# Patient Record
Sex: FEMALE | Race: WHITE | Employment: STUDENT | URBAN - METROPOLITAN AREA
[De-identification: names, ages, dates, MRNs, and addresses within clinical notes are randomized per-mention and may not be internally consistent; named-entity substitution may affect disease eponyms.]

---
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
## 2019-01-08 ENCOUNTER — RASH (OUTPATIENT)
Dept: URBAN - METROPOLITAN AREA CLINIC 31 | Facility: CLINIC | Age: 19
Setting detail: DERMATOLOGY
End: 2019-01-08

## 2019-01-08 PROCEDURE — 99213 OFFICE O/P EST LOW 20 MIN: CPT

## 2019-01-08 PROCEDURE — 87101 SKIN FUNGI CULTURE: CPT

## 2019-01-17 ENCOUNTER — RX ONLY (RX ONLY)
Age: 19
End: 2019-01-17

## 2019-01-17 RX ORDER — MUPIROCIN 20 MG/G
1 APPLICATION OINTMENT TOPICAL QHS
Qty: 30 | Refills: 0
Start: 2019-01-17

## 2019-01-17 RX ORDER — DOXYCYCLINE HYCLATE 100 MG/1
1 CAPSULE CAPSULE, GELATIN COATED ORAL BID
Qty: 20 | Refills: 0
Start: 2019-01-17

## 2019-02-20 ENCOUNTER — HOSPITAL ENCOUNTER (OUTPATIENT)
Dept: PHYSICAL THERAPY | Age: 19
Discharge: HOME | End: 2019-02-20
Payer: COMMERCIAL

## 2019-02-20 PROCEDURE — 97110 THERAPEUTIC EXERCISES: CPT

## 2019-02-20 PROCEDURE — 97140 MANUAL THERAPY 1/> REGIONS: CPT

## 2019-02-20 PROCEDURE — 97162 PT EVAL MOD COMPLEX 30 MIN: CPT

## 2019-02-20 NOTE — THERAPY EVALUATION
Yimi Perdue  : 2000 [id number] [facility],2[address] Floor [address]  [address] [place] [address].  [phone]   [phone]        OUTPATIENT PHYSICAL THERAPY:Initial Assessment 2019         ICD-10: Treatment Diagnosis: Pain in right knee (M25.561) ,   Precautions/Allergies:   Patient has no allergy information on record. Fall Risk Score:     Ambulatory/Rehab Services H2 Model Falls Risk Assessment    Risk Factors:       No Risk Factors Identified Ability to Rise from Chair:       (0)  Ability to rise in a single movement    Falls Prevention Plan:       No modifications necessary   Total: (5 or greater = High Risk): 0     [facility] of [employer]. All Rights Reserved. [facility] [employer] Patent #[id number]. Federal Law prohibits the replication, distribution or use without written permission from [facility] of [address] [place] [address]     MD Orders: Eval and Treat  MEDICAL/REFERRING DIAGNOSIS:  Right knee pain [M25.561]   DATE OF ONSET: 3 weeks ago   REFERRING PHYSICIAN: Siffri, Brodie Runner, MD  RETURN PHYSICIAN APPOINTMENT: tbd      INITIAL ASSESSMENT:  Ms. Starla Medina presents to therapy with pain in the R knee that started a few months ago and has gotten worse. Pt is having decreased endurance due to the pain and is not able to perform any gym activities or running without the pain. Pt would benefit from skilled PT for above deficits following an MRI next week to return to prior level of function painfree. PROBLEM LIST (Impacting functional limitations):  1. Decreased Strength  2. Decreased Ambulation Ability/Technique  3. Decreased Balance  4. Increased Pain  5. Decreased Activity Tolerance INTERVENTIONS PLANNED:  1. Balance Exercise  2. Cold  3. Electrical Stimulation  4. Heat  5. Home Exercise Program (HEP)  6. Iontophoresis  7. Manual Therapy  8. Therapeutic Activites  9. Therapeutic Exercise/Strengthening  10. Ultrasound (US)  11. Whirlpool  12.  Dry needling    TREATMENT PLAN:  Effective Dates: 2/20/2019 TO 4/21/2019 (60 days). Frequency/Duration: 2 times a week for 60 Days  GOALS: (Goals have been discussed and agreed upon with patient.)  Short-Term Functional Goals: Time Frame: 4 weeks   1. Ms. Edilia Pizano to be independent with HEP. 2. Pt able to have no pain with ambulation or at rest 100% of time. 3. Pt to demo full HS stretch without pain in the back of the knee. Discharge Goals: Time Frame: 8 weeks   1. Pt able to return to running painfree in the R knee 100% of time. 2. Pt able to demo normal gym activities without limtiations 100% of time. Rehabilitation Potential For Stated Goals: Excellent  Regarding [facility] therapy, I certify that the treatment plan above will be carried out by a therapist or under their direction. Thank you for this referral,  Geno Machado, PT, DPT       Referring Physician Signature: Joanne Newton MD              Date                      HISTORY:   History of Present Injury/Illness (Reason for Referral):  Pt 21 y/o F with pain in the R knee that started after running in January. She ran a marathon back in October and had been in therapy previously for the other knee. Pt rested for a solid 2 weeks and the knee starting feeling better. She started having the pain in the front of the knee near the VMO and she now feels it more toward the medial and inside hamstring tendon. Pt stated he does not feel it with with biking but does feel it some with walking as a more strained feeling. Pt is not having any swelling but is scheduled for an MRI on Monday. The doctor wanted to give an injection but wasn't sure if it was quad or hamstring at this time. Past Medical History/Comorbidities:   Ms. Edilia Pizano  has no past medical history on file. Ms. Edilia Pizano  has no past surgical history on file.   Social History/Living Environment:     Lives on campus   Prior Level of Function/Work/Activity:  Student at [employer] Side: RIGHT  Current Medications:  No current outpatient medications on file. Date Last Reviewed:  2/20/2019   # of Personal Factors/Comorbidities that affect the Plan of Care: 3+: HIGH COMPLEXITY   EXAMINATION:   Observation/Orthostatic Postural Assessment:          Good   Palpation:          Tenderness over the gracilis and VMO   ROM:     No complaint of ROM concern      Strength:     L knee      Flexion: 5/5     Extension: 5/5    R knee      Flexion: 5/5     Extension: 5/5    Special Tests:          Lachmans (-), Grind (-), McMurrays (-)  Neurological Screen:        Sensation: no complaint of numbness or tingling   Functional Mobility:         Independent   Balance:          Good    Body Structures Involved:  1. Muscles  2. Ligaments Body Functions Affected:  1. Movement Related Activities and Participation Affected:  1. Mobility   # of elements that affect the Plan of Care: 4+: HIGH COMPLEXITY   CLINICAL PRESENTATION:   Presentation: Evolving clinical presentation with changing clinical characteristics: MODERATE COMPLEXITY   CLINICAL DECISION MAKING:   Tool Used: VAS pain scale : 5/10     Medical Necessity:   · Patient is expected to demonstrate progress in strength and balance to increase independence with functional and recreational activities painfree. .  Reason for Services/Other Comments:  · Patient continues to require present interventions due to patient's inability to perform functional activities painfree. .   Use of outcome tool(s) and clinical judgement create a POC that gives a: Questionable prediction of patient's progress: MODERATE COMPLEXITY   TREATMENT:   (In addition to Assessment/Re-Assessment sessions the following treatments were rendered)      THERAPEUTIC EXERCISE: (see chart below for  minutes):  Exercises per grid below to improve mobility, strength and balance. Required minimal verbal cues to promote proper body alignment, promote proper body posture and promote proper body mechanics. Progressed resistance, range, repetitions and complexity of movement as indicated. MANUAL THERAPY: (see chart below for  minutes): Joint mobilization and Soft tissue mobilization was utilized and necessary because of the patient's restricted joint motion and restricted motion of soft tissue. MODALITIES: (see chart below for  minutes):      see chart below for pain managment. Date: 2-20-19  (EVAL)       Modalities:                                Therapeutic Exercise: 10 mins        HS stretch  With active stretching, AP for nerve glides and using strap for static                                                Proprioceptive Activities:                                Manual Therapy: 15 mins        STM with dry needling  15 mins                Therapeutic Activities:                                        HEP: pt was given stretching and ice massage for home use as well as manual massage for the VMO and hamstring performed independently   [facility] Portal  Treatment/Session Assessment:  Pt is having MRI on Monday and will return to the doctor next Thursday for results. Pt seems to have a strain of the gracilis possibly the medial hamstring with some patellofemoral pain in the R knee. Pt also has some swelling in the popliteal fossa leading to indicate possibly a bakers cyst. MRI will tell results and help guide treatment. Today pt had needling done on the areas for tightness and was told to work on ice and anti-inflammatories for the bakers cyst. .    · Pain/ Symptoms: Initial:   4/10 \"its just sore on the back of the knee now. \"  Post Session:  No increase in pain following session. ·   Compliance with Program/Exercises: Will assess as treatment progresses. · Recommendations/Intent for next treatment session: \"Next visit will focus on advancements to more challenging activities\".   Total Treatment Duration:  PT Patient Time In/Time Out  Time In: 1100  Time Out: 450 Samuel Douglas, PT, DPT

## 2019-02-21 ENCOUNTER — APPOINTMENT (OUTPATIENT)
Dept: PHYSICAL THERAPY | Age: 19
End: 2019-02-21
Payer: COMMERCIAL

## 2019-02-25 ENCOUNTER — APPOINTMENT (OUTPATIENT)
Dept: PHYSICAL THERAPY | Age: 19
End: 2019-02-25
Payer: COMMERCIAL

## 2019-02-27 ENCOUNTER — HOSPITAL ENCOUNTER (OUTPATIENT)
Dept: PHYSICAL THERAPY | Age: 19
Discharge: HOME | End: 2019-02-27
Payer: COMMERCIAL

## 2019-02-27 PROCEDURE — 97140 MANUAL THERAPY 1/> REGIONS: CPT

## 2019-02-27 PROCEDURE — 97110 THERAPEUTIC EXERCISES: CPT

## 2019-02-27 NOTE — PROGRESS NOTES
Beck Aguilar  : 2000 [address] [facility],2[address] Floor [address]  [address] [place] [address]  [phone]   [phone]        OUTPATIENT PHYSICAL THERAPY:Daily Note 2019         ICD-10: Treatment Diagnosis: Pain in right knee (M25.561) ,   Precautions/Allergies:   Patient has no allergy information on record. MD Orders: Eval and Treat  MEDICAL/REFERRING DIAGNOSIS:  Right knee pain [M25.561]   DATE OF ONSET: 3 weeks ago   REFERRING PHYSICIAN: Sharon Bridges MD  RETURN PHYSICIAN APPOINTMENT: tbd      INITIAL ASSESSMENT:  Ms. Cash Biswas presents to therapy with pain in the R knee that started a few months ago and has gotten worse. Pt is having decreased endurance due to the pain and is not able to perform any gym activities or running without the pain. Pt would benefit from skilled PT for above deficits following an MRI next week to return to prior level of function painfree. PROBLEM LIST (Impacting functional limitations):  1. Decreased Strength  2. Decreased Ambulation Ability/Technique  3. Decreased Balance  4. Increased Pain  5. Decreased Activity Tolerance INTERVENTIONS PLANNED:  1. Balance Exercise  2. Cold  3. Electrical Stimulation  4. Heat  5. Home Exercise Program (HEP)  6. Iontophoresis  7. Manual Therapy  8. Therapeutic Activites  9. Therapeutic Exercise/Strengthening  10. Ultrasound (US)  11. Whirlpool  12. Dry needling    TREATMENT PLAN:  Effective Dates: 2019 TO 2019 (60 days). Frequency/Duration: 2 times a week for 60 Days  GOALS: (Goals have been discussed and agreed upon with patient.)  Short-Term Functional Goals: Time Frame: 4 weeks   1. Ms. Cash Biswas to be independent with HEP. 2. Pt able to have no pain with ambulation or at rest 100% of time. 3. Pt to demo full HS stretch without pain in the back of the knee. Discharge Goals: Time Frame: 8 weeks   1. Pt able to return to running painfree in the R knee 100% of time. 2. Pt able to demo normal gym activities without limtiations 100% of time. Rehabilitation Potential For Stated Goals: Excellent                HISTORY:   History of Present Injury/Illness (Reason for Referral):  Pt 21 y/o F with pain in the R knee that started after running in January. She ran a marathon back in October and had been in therapy previously for the other knee. Pt rested for a solid 2 weeks and the knee starting feeling better. She started having the pain in the front of the knee near the VMO and she now feels it more toward the medial and inside hamstring tendon. Pt stated he does not feel it with with biking but does feel it some with walking as a more strained feeling. Pt is not having any swelling but is scheduled for an MRI on Monday. The doctor wanted to give an injection but wasn't sure if it was quad or hamstring at this time. Past Medical History/Comorbidities:   Ms. Beba Parson  has no past medical history on file. Ms. Beba Parson  has no past surgical history on file. Social History/Living Environment:     Lives on campus   Prior Level of Function/Work/Activity:  Student at [address] [place] [address] Side:         RIGHT  Current Medications:  No current outpatient medications on file. Date Last Reviewed:  2/27/2019   EXAMINATION:   Observation/Orthostatic Postural Assessment:          Good   Palpation:          Tenderness over the gracilis and VMO   ROM:     No complaint of ROM concern      Strength:     L knee      Flexion: 5/5     Extension: 5/5    R knee      Flexion: 5/5     Extension: 5/5    Special Tests:          Lachmans (-), Grind (-), McMurrays (-)  Neurological Screen:        Sensation: no complaint of numbness or tingling   Functional Mobility:         Independent   Balance:          Good    Body Structures Involved:  1. Muscles  2. Ligaments Body Functions Affected:  1. Movement Related Activities and Participation Affected:  1.  Mobility   CLINICAL PRESENTATION:   CLINICAL DECISION MAKING:   Tool Used: VAS pain scale : 5/10     Medical Necessity:   · Patient is expected to demonstrate progress in strength and balance to increase independence with functional and recreational activities painfree. .  Reason for Services/Other Comments:  · Patient continues to require present interventions due to patient's inability to perform functional activities painfree. .   TREATMENT:   (In addition to Assessment/Re-Assessment sessions the following treatments were rendered)      THERAPEUTIC EXERCISE: (see chart below for  minutes):  Exercises per grid below to improve mobility, strength and balance. Required minimal verbal cues to promote proper body alignment, promote proper body posture and promote proper body mechanics. Progressed resistance, range, repetitions and complexity of movement as indicated. MANUAL THERAPY: (see chart below for  minutes): Joint mobilization and Soft tissue mobilization was utilized and necessary because of the patient's restricted joint motion and restricted motion of soft tissue. MODALITIES: (see chart below for  minutes):      see chart below for pain managment.         Date: 2-20-19  (EVAL) 2-27-19  (Visit 2)       Modalities:                                Therapeutic Exercise: 10 mins  30 mins       HS stretch  With active stretching, AP for nerve glides and using strap for static  Repeat       Resisted side stepping   Green band with push out kettlebell 2L knees bent       RDL walking   Red kettlebell  Forward and back with high knee and lunge 2L       Bike   5 mins       Walking warm up   1L each               Proprioceptive Activities:                                Manual Therapy: 15 mins  15 mins       STM with dry needling  15 mins  15 mins               Therapeutic Activities:                                        HEP: pt was given stretching and ice massage for home use as well as manual massage for the VMO and hamstring performed independently   [facility] Portal  Treatment/Session Assessment:  Pt is feeling much better and is not having any issues with the knee today. Pt was told she could return to jog walk protocol for the day and will see the results of the MRI on Thursday. Continue with POC once pt returns from spring break in a week. · Pain/ Symptoms: Initial:   1/10 \"I can just feel it but I had my MRI yesterday and im going back to see the doctor tomorrow. \"  Post Session:  No increase in pain following session. ·   Compliance with Program/Exercises: Will assess as treatment progresses. · Recommendations/Intent for next treatment session: \"Next visit will focus on advancements to more challenging activities\".   Total Treatment Duration:  PT Patient Time In/Time Out  Time In: 0800  Time Out: [address], PT, DPT

## 2019-03-06 ENCOUNTER — APPOINTMENT (OUTPATIENT)
Dept: PHYSICAL THERAPY | Age: 19
End: 2019-03-06
Payer: COMMERCIAL

## 2019-03-08 ENCOUNTER — APPOINTMENT (OUTPATIENT)
Dept: PHYSICAL THERAPY | Age: 19
End: 2019-03-08
Payer: COMMERCIAL

## 2019-03-13 ENCOUNTER — HOSPITAL ENCOUNTER (OUTPATIENT)
Dept: PHYSICAL THERAPY | Age: 19
Discharge: HOME | End: 2019-03-13
Payer: COMMERCIAL

## 2019-03-15 ENCOUNTER — HOSPITAL ENCOUNTER (OUTPATIENT)
Dept: PHYSICAL THERAPY | Age: 19
Discharge: HOME | End: 2019-03-15
Payer: COMMERCIAL

## 2019-03-15 PROCEDURE — 97140 MANUAL THERAPY 1/> REGIONS: CPT

## 2019-03-15 PROCEDURE — 97110 THERAPEUTIC EXERCISES: CPT

## 2019-03-15 NOTE — PROGRESS NOTES
Kushal Pierson  : 2000 [address] [facility],[address] Floor [address]  [address] [place] [address]  [phone]   [phone]        OUTPATIENT PHYSICAL THERAPY:Daily Note 3/15/2019         ICD-10: Treatment Diagnosis: Pain in right knee (M25.561) ,   Precautions/Allergies:   Patient has no allergy information on record. MD Orders: Eval and Treat  MEDICAL/REFERRING DIAGNOSIS:  Right knee pain [M25.561]   DATE OF ONSET: 3 weeks ago   REFERRING PHYSICIAN: Arden Bridges MD  RETURN PHYSICIAN APPOINTMENT: tbd      INITIAL ASSESSMENT:  Ms. Jason Neal presents to therapy with pain in the R knee that started a few months ago and has gotten worse. Pt is having decreased endurance due to the pain and is not able to perform any gym activities or running without the pain. Pt would benefit from skilled PT for above deficits following an MRI next week to return to prior level of function painfree. PROBLEM LIST (Impacting functional limitations):  1. Decreased Strength  2. Decreased Ambulation Ability/Technique  3. Decreased Balance  4. Increased Pain  5. Decreased Activity Tolerance INTERVENTIONS PLANNED:  1. Balance Exercise  2. Cold  3. Electrical Stimulation  4. Heat  5. Home Exercise Program (HEP)  6. Iontophoresis  7. Manual Therapy  8. Therapeutic Activites  9. Therapeutic Exercise/Strengthening  10. Ultrasound (US)  11. Whirlpool  12. Dry needling    TREATMENT PLAN:  Effective Dates: 2019 TO 2019 (60 days). Frequency/Duration: 2 times a week for 60 Days  GOALS: (Goals have been discussed and agreed upon with patient.)  Short-Term Functional Goals: Time Frame: 4 weeks   1. Ms. Jason Neal to be independent with HEP. 2. Pt able to have no pain with ambulation or at rest 100% of time. 3. Pt to demo full HS stretch without pain in the back of the knee. Discharge Goals: Time Frame: 8 weeks   1. Pt able to return to running painfree in the R knee 100% of time. 2. Pt able to demo normal gym activities without limtiations 100% of time. Rehabilitation Potential For Stated Goals: Excellent                HISTORY:   History of Present Injury/Illness (Reason for Referral):  Pt 21 y/o F with pain in the R knee that started after running in January. She ran a marathon back in October and had been in therapy previously for the other knee. Pt rested for a solid 2 weeks and the knee starting feeling better. She started having the pain in the front of the knee near the VMO and she now feels it more toward the medial and inside hamstring tendon. Pt stated he does not feel it with with biking but does feel it some with walking as a more strained feeling. Pt is not having any swelling but is scheduled for an MRI on Monday. The doctor wanted to give an injection but wasn't sure if it was quad or hamstring at this time. Past Medical History/Comorbidities:   Ms. Franki Reid  has no past medical history on file. Ms. Franki Reid  has no past surgical history on file. Social History/Living Environment:     Lives on campus   Prior Level of Function/Work/Activity:  Student at [address] [place] [address] Side:         RIGHT  Current Medications:  No current outpatient medications on file. Date Last Reviewed:  3/15/2019   EXAMINATION:   Observation/Orthostatic Postural Assessment:          Good   Palpation:          Tenderness over the gracilis and VMO   ROM:     No complaint of ROM concern      Strength:     L knee      Flexion: 5/5     Extension: 5/5    R knee      Flexion: 5/5     Extension: 5/5    Special Tests:          Lachmans (-), Grind (-), McMurrays (-)  Neurological Screen:        Sensation: no complaint of numbness or tingling   Functional Mobility:         Independent   Balance:          Good    Body Structures Involved:  1. Muscles  2. Ligaments Body Functions Affected:  1. Movement Related Activities and Participation Affected:  1.  Mobility   CLINICAL PRESENTATION:   CLINICAL DECISION MAKING:   Tool Used: VAS pain scale : 5/10     Medical Necessity:   · Patient is expected to demonstrate progress in strength and balance to increase independence with functional and recreational activities painfree. .  Reason for Services/Other Comments:  · Patient continues to require present interventions due to patient's inability to perform functional activities painfree. .   TREATMENT:   (In addition to Assessment/Re-Assessment sessions the following treatments were rendered)      THERAPEUTIC EXERCISE: (see chart below for  minutes):  Exercises per grid below to improve mobility, strength and balance. Required minimal verbal cues to promote proper body alignment, promote proper body posture and promote proper body mechanics. Progressed resistance, range, repetitions and complexity of movement as indicated. MANUAL THERAPY: (see chart below for  minutes): Joint mobilization and Soft tissue mobilization was utilized and necessary because of the patient's restricted joint motion and restricted motion of soft tissue. MODALITIES: (see chart below for  minutes):      see chart below for pain managment.         Date: 2-20-19  (EVAL) 2-27-19  (Visit 2)  3-15-19  (Visit 3)      Modalities:                                Therapeutic Exercise: 10 mins  30 mins  30 mins      HS stretch  With active stretching, AP for nerve glides and using strap for static  Repeat  Repeat      Resisted side stepping   Green band with push out kettlebell 2L knees bent  Repeat      RDL walking   Red kettlebell  Forward and back with high knee and lunge 2L  Repeat      Bike   5 mins  Repeat      Walking warm up   1L each  Repeat      Leg press    # 3x8     Proprioceptive Activities:        Toe tapping on foam    3 way x10 each                      Manual Therapy: 15 mins  15 mins  15 mins      STM with dry needling  15 mins  15 mins  15 mins              Therapeutic Activities:                                        HEP: pt was given stretching and ice massage for home use as well as manual massage for the VMO and hamstring performed independently   [facility] Portal  Treatment/Session Assessment:  MRI found small effusion and small Bakers cyst is noted. 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 Pt was told to return to running and try to push the knee a little and see how she feels. Continue with POC 1 time a week. · Pain/ Symptoms: Initial:  0/10 \"Its not hurting me today but im afraid to push it too hard. \"  Post Session:  No increase in pain following session. ·   Compliance with Program/Exercises: Will assess as treatment progresses. · Recommendations/Intent for next treatment session: \"Next visit will focus on advancements to more challenging activities\".   Total Treatment Duration:  PT Patient Time In/Time Out  Time In: 6118  Time Out: 50 Abebe Estrada, PT, DPT

## 2019-03-22 ENCOUNTER — HOSPITAL ENCOUNTER (OUTPATIENT)
Dept: PHYSICAL THERAPY | Age: 19
Discharge: HOME | End: 2019-03-22
Payer: COMMERCIAL

## 2019-03-22 PROCEDURE — 97110 THERAPEUTIC EXERCISES: CPT

## 2019-03-22 PROCEDURE — 97140 MANUAL THERAPY 1/> REGIONS: CPT

## 2019-03-22 NOTE — PROGRESS NOTES
Ajith Bennett  : 2000 [address] [facility],[address]  [address] [place] [address].  [phone]   [phone]        OUTPATIENT PHYSICAL THERAPY:Daily Note 3/22/2019         ICD-10: Treatment Diagnosis: Pain in right knee (M25.561) ,   Precautions/Allergies:   Patient has no allergy information on record. MD Orders: Eval and Treat  MEDICAL/REFERRING DIAGNOSIS:  Right knee pain [M25.561]   DATE OF ONSET: 3 weeks ago   REFERRING PHYSICIAN: Niyah Bridges MD  RETURN PHYSICIAN APPOINTMENT: tbd      INITIAL ASSESSMENT:  Ms. Coleen Madrigal presents to therapy with pain in the R knee that started a few months ago and has gotten worse. Pt is having decreased endurance due to the pain and is not able to perform any gym activities or running without the pain. Pt would benefit from skilled PT for above deficits following an MRI next week to return to prior level of function painfree. PROBLEM LIST (Impacting functional limitations):  1. Decreased Strength  2. Decreased Ambulation Ability/Technique  3. Decreased Balance  4. Increased Pain  5. Decreased Activity Tolerance INTERVENTIONS PLANNED:  1. Balance Exercise  2. Cold  3. Electrical Stimulation  4. Heat  5. Home Exercise Program (HEP)  6. Iontophoresis  7. Manual Therapy  8. Therapeutic Activites  9. Therapeutic Exercise/Strengthening  10. Ultrasound (US)  11. Whirlpool  12. Dry needling    TREATMENT PLAN:  Effective Dates: 2019 TO 2019 (60 days). Frequency/Duration: 2 times a week for 60 Days  GOALS: (Goals have been discussed and agreed upon with patient.)  Short-Term Functional Goals: Time Frame: 4 weeks   1. Ms. Coleen Madrigal to be independent with HEP. 2. Pt able to have no pain with ambulation or at rest 100% of time. 3. Pt to demo full HS stretch without pain in the back of the knee. Discharge Goals: Time Frame: 8 weeks   1. Pt able to return to running painfree in the R knee 100% of time. 2. Pt able to demo normal gym activities without limtiations 100% of time. Rehabilitation Potential For Stated Goals: Excellent                HISTORY:   History of Present Injury/Illness (Reason for Referral):  Pt 21 y/o F with pain in the R knee that started after running in January. She ran a marathon back in October and had been in therapy previously for the other knee. Pt rested for a solid 2 weeks and the knee starting feeling better. She started having the pain in the front of the knee near the VMO and she now feels it more toward the medial and inside hamstring tendon. Pt stated he does not feel it with with biking but does feel it some with walking as a more strained feeling. Pt is not having any swelling but is scheduled for an MRI on Monday. The doctor wanted to give an injection but wasn't sure if it was quad or hamstring at this time. Past Medical History/Comorbidities:   Ms. Darcie Mobley  has no past medical history on file. Ms. Darcie Mobley  has no past surgical history on file. 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 Date Last Reviewed:  3/22/2019   EXAMINATION:   Observation/Orthostatic Postural Assessment:          Good   Palpation:          Tenderness over the gracilis and VMO   ROM:     No complaint of ROM concern      Strength:     L knee      Flexion: 5/5     Extension: 5/5    R knee      Flexion: 5/5     Extension: 5/5    Special Tests:          Lachmans (-), Grind (-), McMurrays (-)  Neurological Screen:        Sensation: no complaint of numbness or tingling   Functional Mobility:         Independent   Balance:          Good    Body Structures Involved:  1. Muscles  2. Ligaments Body Functions Affected:  1. Movement Related Activities and Participation Affected:  1.  Mobility   CLINICAL PRESENTATION:   CLINICAL DECISION MAKING:   Tool Used: VAS pain scale : 5/10     Medical Necessity:   · Patient is expected to demonstrate progress in strength and balance to increase independence with functional and recreational activities painfree. .  Reason for Services/Other Comments:  · Patient continues to require present interventions due to patient's inability to perform functional activities painfree. .   TREATMENT:   (In addition to Assessment/Re-Assessment sessions the following treatments were rendered)      THERAPEUTIC EXERCISE: (see chart below for  minutes):  Exercises per grid below to improve mobility, strength and balance. Required minimal verbal cues to promote proper body alignment, promote proper body posture and promote proper body mechanics. Progressed resistance, range, repetitions and complexity of movement as indicated. MANUAL THERAPY: (see chart below for  minutes): Joint mobilization and Soft tissue mobilization was utilized and necessary because of the patient's restricted joint motion and restricted motion of soft tissue. MODALITIES: (see chart below for  minutes):      see chart below for pain managment.         Date: 2-20-19  (EVAL) 2-27-19  (Visit 2)  3-15-19  (Visit 3)  3-22-19  (Visit 4)     Modalities:                                Therapeutic Exercise: 10 mins  30 mins  30 mins  30 mins     HS stretch  With active stretching, AP for nerve glides and using strap for static  Repeat  Repeat  Repeat     Resisted side stepping   Green band with push out kettlebell 2L knees bent  Repeat  Repeat     RDL walking   Red kettlebell  Forward and back with high knee and lunge 2L  Repeat  Repeat     Bike   5 mins  Repeat  5 mins     Walking warm up   1L each  Repeat  2L     Leg press    # 3x8 120# 3x10     Proprioceptive Activities:        Toe tapping on foam    3 way x10 each                      Manual Therapy: 15 mins  15 mins  15 mins  15 mins     STM with dry needling  15 mins  15 mins  15 mins  15 mins Therapeutic Activities:        Eccentric heel drop     6 in 3x10     Side lunges and reverse     2x10     SL squat with slider     2x10             HEP: pt was given stretching and ice massage for home use as well as manual massage for the VMO and hamstring performed independently   [facility] Portal  Treatment/Session Assessment:  Pt is still showing increased weakness in the VMO and was given more exercises to focus on the VMO in the gym. Continue with strengthening 1-2 times a week. · Pain/ Symptoms: Initial:  0/10 \"Its not hurting me today but im afraid to push it too hard. \"  Post Session:  No increase in pain following session. ·   Compliance with Program/Exercises: Will assess as treatment progresses. · Recommendations/Intent for next treatment session: \"Next visit will focus on advancements to more challenging activities\".   Total Treatment Duration:  PT Patient Time In/Time Out  Time In: 2142  Time Out: 50 Abebe Estrada, PT, DPT

## 2019-03-27 ENCOUNTER — HOSPITAL ENCOUNTER (OUTPATIENT)
Dept: PHYSICAL THERAPY | Age: 19
Discharge: HOME | End: 2019-03-27
Payer: COMMERCIAL

## 2019-03-27 PROCEDURE — 97110 THERAPEUTIC EXERCISES: CPT

## 2019-03-27 PROCEDURE — 97140 MANUAL THERAPY 1/> REGIONS: CPT

## 2019-03-27 NOTE — PROGRESS NOTES
Lindsey Krause  : 2000 [address] [address], [address] [place] [address]  [phone]   [phone]        OUTPATIENT PHYSICAL THERAPY:Daily Note 3/27/2019         ICD-10: Treatment Diagnosis: Pain in right knee (M25.561) ,   Precautions/Allergies:   Patient has no allergy information on record. MD Orders: Eval and Treat  MEDICAL/REFERRING DIAGNOSIS:  Right knee pain [M25.561]   DATE OF ONSET: 3 weeks ago   REFERRING PHYSICIAN: Sai Bridges MD  RETURN PHYSICIAN APPOINTMENT: tbd      INITIAL ASSESSMENT:  Ms. Beck Lr presents to therapy with pain in the R knee that started a few months ago and has gotten worse. Pt is having decreased endurance due to the pain and is not able to perform any gym activities or running without the pain. Pt would benefit from skilled PT for above deficits following an MRI next week to return to prior level of function painfree. PROBLEM LIST (Impacting functional limitations):  1. Decreased Strength  2. Decreased Ambulation Ability/Technique  3. Decreased Balance  4. Increased Pain  5. Decreased Activity Tolerance INTERVENTIONS PLANNED:  1. Balance Exercise  2. Cold  3. Electrical Stimulation  4. Heat  5. Home Exercise Program (HEP)  6. Iontophoresis  7. Manual Therapy  8. Therapeutic Activites  9. Therapeutic Exercise/Strengthening  10. Ultrasound (US)  11. Whirlpool  12. Dry needling    TREATMENT PLAN:  Effective Dates: 2019 TO 2019 (60 days). Frequency/Duration: 2 times a week for 60 Days  GOALS: (Goals have been discussed and agreed upon with patient.)  Short-Term Functional Goals: Time Frame: 4 weeks   1. Ms. Beck Lr to be independent with HEP. 2. Pt able to have no pain with ambulation or at rest 100% of time. 3. Pt to demo full HS stretch without pain in the back of the knee. Discharge Goals: Time Frame: 8 weeks   1. Pt able to return to running painfree in the R knee 100% of time. 2. Pt able to demo normal gym activities without limtiations 100% of time. Rehabilitation Potential For Stated Goals: Excellent                HISTORY:   History of Present Injury/Illness (Reason for Referral):  Pt 19 y/o F with pain in the R knee that started after running in January. She ran a marathon back in October and had been in therapy previously for the other knee. Pt rested for a solid 2 weeks and the knee starting feeling better. She started having the pain in the front of the knee near the VMO and she now feels it more toward the medial and inside hamstring tendon. Pt stated he does not feel it with with biking but does feel it some with walking as a more strained feeling. Pt is not having any swelling but is scheduled for an MRI on Monday. The doctor wanted to give an injection but wasn't sure if it was quad or hamstring at this time. Past Medical History/Comorbidities:   Ms. Otis Maya  has no past medical history on file. Ms. Otis Maya  has no past surgical history on file. Social History/Living Environment:     Lives on campus   Prior Level of Function/Work/Activity:  Student at [address] [place] [address] Side:         RIGHT  Current Medications:  No current outpatient medications on file. Date Last Reviewed:  3/27/2019   EXAMINATION:   Observation/Orthostatic Postural Assessment:          Good   Palpation:          Tenderness over the gracilis and VMO   ROM:     No complaint of ROM concern      Strength:     L knee      Flexion: 5/5     Extension: 5/5    R knee      Flexion: 5/5     Extension: 5/5    Special Tests:          Lachmans (-), Grind (-), McMurrays (-)  Neurological Screen:        Sensation: no complaint of numbness or tingling   Functional Mobility:         Independent   Balance:          Good    Body Structures Involved:  1. Muscles  2. Ligaments Body Functions Affected:  1. Movement Related Activities and Participation Affected:  1.  Mobility   CLINICAL PRESENTATION:   CLINICAL DECISION MAKING:   Tool Used: VAS pain scale : 5/10     Medical Necessity:   · Patient is expected to demonstrate progress in strength and balance to increase independence with functional and recreational activities painfree. .  Reason for Services/Other Comments:  · Patient continues to require present interventions due to patient's inability to perform functional activities painfree. .   TREATMENT:   (In addition to Assessment/Re-Assessment sessions the following treatments were rendered)      THERAPEUTIC EXERCISE: (see chart below for  minutes):  Exercises per grid below to improve mobility, strength and balance. Required minimal verbal cues to promote proper body alignment, promote proper body posture and promote proper body mechanics. Progressed resistance, range, repetitions and complexity of movement as indicated. MANUAL THERAPY: (see chart below for  minutes): Joint mobilization and Soft tissue mobilization was utilized and necessary because of the patient's restricted joint motion and restricted motion of soft tissue. MODALITIES: (see chart below for  minutes):      see chart below for pain managment.         Date: 2-20-19  (EVAL) 2-27-19  (Visit 2)  3-15-19  (Visit 3)  3-22-19  (Visit 4)  3-27-19  (Visit 5)    Modalities:                                Therapeutic Exercise: 10 mins  30 mins  30 mins  30 mins  30 mins    HS stretch  With active stretching, AP for nerve glides and using strap for static  Repeat  Repeat  Repeat  Repeat    Resisted side stepping   Green band with push out kettlebell 2L knees bent  Repeat  Repeat  Repeat blue band    RDL walking   Red kettlebell  Forward and back with high knee and lunge 2L  Repeat  Repeat  Repeat    Bike   5 mins  Repeat  5 mins  Repeat    Walking warm up   1L each  Repeat  2L  Repeat    Leg press    # 3x8 120# 3x10  120# x15  140# x 10  160# x 6   Proprioceptive Activities:        Toe tapping on foam    3 way x10 each                      Manual Therapy: 15 mins  15 mins  15 mins  15 mins  15 mins    STM with dry needling  15 mins  15 mins  15 mins  15 mins  15 mins VMO and medial quad            Therapeutic Activities:        Eccentric heel drop     6 in 3x10  Repeat    Side lunges and reverse     2x10  Repeat with 8#   Added in walking lunge with twist 2L    SL squat with slider     2x10  repeat   Knee extension machine      Eccentric drop 35# 2x10    bosu step up with high knee      2x10    SL modified chair squat      Red kettlebell 3x10    HEP: pt was given stretching and ice massage for home use as well as manual massage for the VMO and hamstring performed independently   [facility] Portal  Treatment/Session Assessment:  Continue to work on [employer] and adding exercises for strengthening as well as stabiltiy. Continue with POC. · Pain/ Symptoms: Initial:  0/10 \"Im doing good. \"  Post Session:  No increase in pain following session. ·   Compliance with Program/Exercises: Will assess as treatment progresses. · Recommendations/Intent for next treatment session: \"Next visit will focus on advancements to more challenging activities\".   Total Treatment Duration:  PT Patient Time In/Time Out  Time In: 4177  Time Out: 50 Abebe Estrada, PT, DPT

## 2019-03-29 ENCOUNTER — HOSPITAL ENCOUNTER (OUTPATIENT)
Dept: PHYSICAL THERAPY | Age: 19
Discharge: HOME | End: 2019-03-29
Payer: COMMERCIAL

## 2019-03-29 PROCEDURE — 97140 MANUAL THERAPY 1/> REGIONS: CPT

## 2019-03-29 PROCEDURE — 97110 THERAPEUTIC EXERCISES: CPT

## 2019-03-29 NOTE — PROGRESS NOTES
Rhonda De Dios  : 2000 [address]  [address] [place] [address].  [phone]   [phone]        OUTPATIENT PHYSICAL THERAPY:Daily Note 3/29/2019         ICD-10: Treatment Diagnosis: Pain in right knee (M25.561) ,   Precautions/Allergies:   Patient has no allergy information on record. MD Orders: Eval and Treat  MEDICAL/REFERRING DIAGNOSIS:  Right knee pain [M25.561]   DATE OF ONSET: 3 weeks ago   REFERRING PHYSICIAN: iRck Bridges MD  RETURN PHYSICIAN APPOINTMENT: tbd      INITIAL ASSESSMENT:  Ms. Reji Ibanez presents to therapy with pain in the R knee that started a few months ago and has gotten worse. Pt is having decreased endurance due to the pain and is not able to perform any gym activities or running without the pain. Pt would benefit from skilled PT for above deficits following an MRI next week to return to prior level of function painfree. PROBLEM LIST (Impacting functional limitations):  1. Decreased Strength  2. Decreased Ambulation Ability/Technique  3. Decreased Balance  4. Increased Pain  5. Decreased Activity Tolerance INTERVENTIONS PLANNED:  1. Balance Exercise  2. Cold  3. Electrical Stimulation  4. Heat  5. Home Exercise Program (HEP)  6. Iontophoresis  7. Manual Therapy  8. Therapeutic Activites  9. Therapeutic Exercise/Strengthening  10. Ultrasound (US)  11. Whirlpool  12. Dry needling    TREATMENT PLAN:  Effective Dates: 2019 TO 2019 (60 days). Frequency/Duration: 2 times a week for 60 Days  GOALS: (Goals have been discussed and agreed upon with patient.)  Short-Term Functional Goals: Time Frame: 4 weeks   1. Ms. Reji Ibanez to be independent with HEP. 2. Pt able to have no pain with ambulation or at rest 100% of time. 3. Pt to demo full HS stretch without pain in the back of the knee. Discharge Goals: Time Frame: 8 weeks   1. Pt able to return to running painfree in the R knee 100% of time. 2. Pt able to demo normal gym activities without limtiations 100% of time. Rehabilitation Potential For Stated Goals: Excellent                HISTORY:   History of Present Injury/Illness (Reason for Referral):  Pt 19 y/o F with pain in the R knee that started after running in January. She ran a marathon back in October and had been in therapy previously for the other knee. Pt rested for a solid 2 weeks and the knee starting feeling better. She started having the pain in the front of the knee near the VMO and she now feels it more toward the medial and inside hamstring tendon. Pt stated he does not feel it with with biking but does feel it some with walking as a more strained feeling. Pt is not having any swelling but is scheduled for an MRI on Monday. The doctor wanted to give an injection but wasn't sure if it was quad or hamstring at this time. Past Medical History/Comorbidities:   Ms. Cari Devi  has no past medical history on file. Ms. Cari Devi  has no past surgical history on file. Social History/Living Environment:     Lives on campus   Prior Level of Function/Work/Activity:  Student at [address] [place] [address] Side:         RIGHT  Current Medications:  No current outpatient medications on file. Date Last Reviewed:  3/29/2019   EXAMINATION:   Observation/Orthostatic Postural Assessment:          Good   Palpation:          Tenderness over the gracilis and VMO   ROM:     No complaint of ROM concern      Strength:     L knee      Flexion: 5/5     Extension: 5/5    R knee      Flexion: 5/5     Extension: 5/5    Special Tests:          Lachmans (-), Grind (-), McMurrays (-)  Neurological Screen:        Sensation: no complaint of numbness or tingling   Functional Mobility:         Independent   Balance:          Good    Body Structures Involved:  1. Muscles  2. Ligaments Body Functions Affected:  1. Movement Related Activities and Participation Affected:  1.  Mobility   CLINICAL PRESENTATION:   CLINICAL DECISION MAKING:   Tool Used: VAS pain scale : 5/10     Medical Necessity:   · Patient is expected to demonstrate progress in strength and balance to increase independence with functional and recreational activities painfree. .  Reason for Services/Other Comments:  · Patient continues to require present interventions due to patient's inability to perform functional activities painfree. .   TREATMENT:   (In addition to Assessment/Re-Assessment sessions the following treatments were rendered)      THERAPEUTIC EXERCISE: (see chart below for  minutes):  Exercises per grid below to improve mobility, strength and balance. Required minimal verbal cues to promote proper body alignment, promote proper body posture and promote proper body mechanics. Progressed resistance, range, repetitions and complexity of movement as indicated. MANUAL THERAPY: (see chart below for  minutes): Joint mobilization and Soft tissue mobilization was utilized and necessary because of the patient's restricted joint motion and restricted motion of soft tissue. MODALITIES: (see chart below for  minutes):      see chart below for pain managment. Date: 2-20-19  (EVAL) 2-27-19  (Visit 2)  3-15-19  (Visit 3)  3-22-19  (Visit 4)  3-27-19  (Visit 5)  3-29-19  (Visit 6)    Modalities:          Ice massage       5 mins                      Therapeutic Exercise: 10 mins  30 mins  30 mins  30 mins  30 mins  25 mins    HS stretch  With active stretching, AP for nerve glides and using strap for static  Repeat  Repeat  Repeat  Repeat  Repeat    Resisted side stepping   Green band with push out kettlebell 2L knees bent  Repeat  Repeat  Repeat blue band  Repeat    RDL walking   Red kettlebell  Forward and back with high knee and lunge 2L  Repeat  Repeat  Repeat  Repeat with lunge and twist 2L 8#    Bike   5 mins  Repeat  5 mins  Repeat  5 mins    Walking warm up   1L each  Repeat  2L  Repeat  Repeat    Leg press    # 3x8 120# 3x10  120# x15  140# x 10  160# x 6 Repeat same weight    Proprioceptive Activities:         Toe tapping on foam    3 way x10 each                         Manual Therapy: 15 mins  15 mins  15 mins  15 mins  15 mins  15mins    STM with dry needling  15 mins  15 mins  15 mins  15 mins  15 mins VMO and medial quad  Repeat             Therapeutic Activities:         Eccentric heel drop     6 in 3x10  Repeat     Side lunges and reverse     2x10  Repeat with 8#   Added in walking lunge with twist 2L  Repeat with twist bilaterally 2L   SL squat with slider     2x10  repeat    Knee extension machine      Eccentric drop 35# 2x10  Repeat    bosu step up with high knee      2x10  Repeat with 3SH   Jump down/landing with hop      20 in step x5             SL modified chair squat      Red kettlebell 3x10     HEP: pt was given stretching and ice massage for home use as well as manual massage for the VMO and hamstring performed independently   [employer] Portal  Treatment/Session Assessment:  Continue to work on [employer] and adding exercises for strengthening as well as stabiltiy. Continue with POC. · Pain/ Symptoms: Initial:  0/10 \"Im doing good. \"  Post Session:  No increase in pain following session. ·   Compliance with Program/Exercises: Will assess as treatment progresses. · Recommendations/Intent for next treatment session: \"Next visit will focus on advancements to more challenging activities\".   Total Treatment Duration:  PT Patient Time In/Time Out  Time In: 6831  Time Out: 50 Abebe Estrada, PT, DPT

## 2019-04-03 ENCOUNTER — HOSPITAL ENCOUNTER (OUTPATIENT)
Dept: PHYSICAL THERAPY | Age: 19
Discharge: HOME | End: 2019-04-03
Payer: COMMERCIAL

## 2019-04-03 PROCEDURE — 97110 THERAPEUTIC EXERCISES: CPT

## 2019-04-03 PROCEDURE — 97140 MANUAL THERAPY 1/> REGIONS: CPT

## 2019-04-03 NOTE — PROGRESS NOTES
Zachphyllis Angelesgailrobe  : 2000 [address], [facility] LARRY. [address].  [phone]   [phone]        OUTPATIENT PHYSICAL THERAPY:Daily Note 4/3/2019         ICD-10: Treatment Diagnosis: Pain in right knee (M25.561) ,   Precautions/Allergies:   Patient has no allergy information on record. MD Orders: Eval and Treat  MEDICAL/REFERRING DIAGNOSIS:  Right knee pain [M25.561]   DATE OF ONSET: 3 weeks ago   REFERRING PHYSICIAN: Mary Lou Bridges MD  RETURN PHYSICIAN APPOINTMENT: tbd      INITIAL ASSESSMENT:  Ms. Karol Machuca presents to therapy with pain in the R knee that started a few months ago and has gotten worse. Pt is having decreased endurance due to the pain and is not able to perform any gym activities or running without the pain. Pt would benefit from skilled PT for above deficits following an MRI next week to return to prior level of function painfree. PROBLEM LIST (Impacting functional limitations):  1. Decreased Strength  2. Decreased Ambulation Ability/Technique  3. Decreased Balance  4. Increased Pain  5. Decreased Activity Tolerance INTERVENTIONS PLANNED:  1. Balance Exercise  2. Cold  3. Electrical Stimulation  4. Heat  5. Home Exercise Program (HEP)  6. Iontophoresis  7. Manual Therapy  8. Therapeutic Activites  9. Therapeutic Exercise/Strengthening  10. Ultrasound (US)  11. Whirlpool  12. Dry needling    TREATMENT PLAN:  Effective Dates: 2019 TO 2019 (60 days). Frequency/Duration: 2 times a week for 60 Days  GOALS: (Goals have been discussed and agreed upon with patient.)  Short-Term Functional Goals: Time Frame: 4 weeks   1. Ms. Karol Machuca to be independent with HEP. 2. Pt able to have no pain with ambulation or at rest 100% of time. 3. Pt to demo full HS stretch without pain in the back of the knee. Discharge Goals: Time Frame: 8 weeks   1. Pt able to return to running painfree in the R knee 100% of time. 2. Pt able to demo normal gym activities without limtiations 100% of time. Rehabilitation Potential For Stated Goals: Excellent                HISTORY:   History of Present Injury/Illness (Reason for Referral):  Pt 19 y/o F with pain in the R knee that started after running in January. She ran a marathon back in October and had been in therapy previously for the other knee. Pt rested for a solid 2 weeks and the knee starting feeling better. She started having the pain in the front of the knee near the VMO and she now feels it more toward the medial and inside hamstring tendon. Pt stated he does not feel it with with biking but does feel it some with walking as a more strained feeling. Pt is not having any swelling but is scheduled for an MRI on Monday. The doctor wanted to give an injection but wasn't sure if it was quad or hamstring at this time. Past Medical History/Comorbidities:   Ms. Katy Quiñonez  has no past medical history on file. Ms. Katy Quiñonez  has no past surgical history on file. 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 Date Last Reviewed:  4/3/2019   EXAMINATION:   Observation/Orthostatic Postural Assessment:          Good   Palpation:          Tenderness over the gracilis and VMO   ROM:     No complaint of ROM concern      Strength:     L knee      Flexion: 5/5     Extension: 5/5    R knee      Flexion: 5/5     Extension: 5/5    Special Tests:          Lachmans (-), Grind (-), McMurrays (-)  Neurological Screen:        Sensation: no complaint of numbness or tingling   Functional Mobility:         Independent   Balance:          Good    Body Structures Involved:  1. Muscles  2. Ligaments Body Functions Affected:  1. Movement Related Activities and Participation Affected:  1.  Mobility   CLINICAL PRESENTATION:   CLINICAL DECISION MAKING:   Tool Used: VAS pain scale : 5/10     Medical Necessity:   · Patient is expected to demonstrate progress in strength and balance to increase independence with functional and recreational activities painfree. .  Reason for Services/Other Comments:  · Patient continues to require present interventions due to patient's inability to perform functional activities painfree. .   TREATMENT:   (In addition to Assessment/Re-Assessment sessions the following treatments were rendered)      THERAPEUTIC EXERCISE: (see chart below for  minutes):  Exercises per grid below to improve mobility, strength and balance. Required minimal verbal cues to promote proper body alignment, promote proper body posture and promote proper body mechanics. Progressed resistance, range, repetitions and complexity of movement as indicated. MANUAL THERAPY: (see chart below for  minutes): Joint mobilization and Soft tissue mobilization was utilized and necessary because of the patient's restricted joint motion and restricted motion of soft tissue. MODALITIES: (see chart below for  minutes):      see chart below for pain managment. Date: 2-20-19  (EVAL) 2-27-19  (Visit 2)  3-15-19  (Visit 3)  3-22-19  (Visit 4)  3-27-19  (Visit 5)  3-29-19  (Visit 6)  4-4-19  (Visit 7)    Modalities:           Ice massage       5 mins  5 mins                        Therapeutic Exercise: 10 mins  30 mins  30 mins  30 mins  30 mins  25 mins  35 mins    HS stretch  With active stretching, AP for nerve glides and using strap for static  Repeat  Repeat  Repeat  Repeat  Repeat  Repeat    Resisted side stepping   Green band with push out kettlebell 2L knees bent  Repeat  Repeat  Repeat blue band  Repeat  Repeat    RDL walking   Red kettlebell  Forward and back with high knee and lunge 2L  Repeat  Repeat  Repeat  Repeat with lunge and twist 2L 8#  Repeat    Bike   5 mins  Repeat  5 mins  Repeat  5 mins  Repeat    Walking warm up   1L each  Repeat  2L  Repeat Repeat  Repeat    Leg press    # 3x8 120# 3x10  120# x15  140# x 10  160# x 6 Repeat same weight     Proprioceptive Activities:          Toe tapping on foam    3 way x10 each     RDL on foam 2x10 with high knee                        Manual Therapy: 15 mins  15 mins  15 mins  15 mins  15 mins  15mins  10 mins    STM with dry needling  15 mins  15 mins  15 mins  15 mins  15 mins VMO and medial quad  Repeat  10 mins              Therapeutic Activities:          Eccentric heel drop     6 in 3x10  Repeat      Side lunges and reverse     2x10  Repeat with 8#   Added in walking lunge with twist 2L  Repeat with twist bilaterally 2L Repeat    SL squat with slider     2x10  repeat  Walking lunges with slider and 8# 2L    Knee extension machine      Eccentric drop 35# 2x10  Repeat     bosu step up with high knee      2x10  Repeat with 3SH    Jump down/landing with hop      20 in step x5  Repeat    bungees        Running on foam in place  Running forward    SL modified chair squat      Red kettlebell 3x10      HEP: pt was given stretching and ice massage for home use as well as manual massage for the VMO and hamstring performed independently   [facility] Portal  Treatment/Session Assessment:  Pt has insisted on going ahead and getting an MRI due to the fact she is still having some of the sharp pain in the knee but she has returned to running at least 2 miles a day and she is not having the burning pain nor the tenderness to touch. Continue to work on [employer] and awaiting results from her doctors visit this weekend. · Pain/ Symptoms: Initial:  0/10 \"Im doing good. \"  Post Session:  No increase in pain following session. ·   Compliance with Program/Exercises: Will assess as treatment progresses. · Recommendations/Intent for next treatment session: \"Next visit will focus on advancements to more challenging activities\".   Total Treatment Duration:  PT Patient Time In/Time Out  Time In: 0330  Time Out: [address] [facility] Andre Render, DPT

## 2019-04-11 ENCOUNTER — HOSPITAL ENCOUNTER (OUTPATIENT)
Dept: PHYSICAL THERAPY | Age: 19
Discharge: HOME | End: 2019-04-11
Payer: COMMERCIAL

## 2019-04-11 PROCEDURE — 97110 THERAPEUTIC EXERCISES: CPT

## 2019-04-11 PROCEDURE — 97140 MANUAL THERAPY 1/> REGIONS: CPT

## 2019-04-11 NOTE — PROGRESS NOTES
Brooke Soliman  : 2000 [address] [facility]  [address] [place] [address]  [phone]   [phone]        OUTPATIENT PHYSICAL THERAPY:Daily Note 2019         ICD-10: Treatment Diagnosis: Pain in right knee (M25.561) ,   Precautions/Allergies:   Patient has no allergy information on record. MD Orders: Eval and Treat  MEDICAL/REFERRING DIAGNOSIS:  Right knee pain [M25.561]   DATE OF ONSET: 3 weeks ago   REFERRING PHYSICIAN: Siffri, Curlie Skiff, MD  RETURN PHYSICIAN APPOINTMENT: tbd      INITIAL ASSESSMENT:  Ms. Bouchra Alejandra presents to therapy with pain in the R knee that started a few months ago and has gotten worse. Pt is having decreased endurance due to the pain and is not able to perform any gym activities or running without the pain. Pt would benefit from skilled PT for above deficits following an MRI next week to return to prior level of function painfree. PROBLEM LIST (Impacting functional limitations):  1. Decreased Strength  2. Decreased Ambulation Ability/Technique  3. Decreased Balance  4. Increased Pain  5. Decreased Activity Tolerance INTERVENTIONS PLANNED:  1. Balance Exercise  2. Cold  3. Electrical Stimulation  4. Heat  5. Home Exercise Program (HEP)  6. Iontophoresis  7. Manual Therapy  8. Therapeutic Activites  9. Therapeutic Exercise/Strengthening  10. Ultrasound (US)  11. Whirlpool  12. Dry needling    TREATMENT PLAN:  Effective Dates: 2019 TO 2019 (60 days). Frequency/Duration: 2 times a week for 60 Days  GOALS: (Goals have been discussed and agreed upon with patient.)  Short-Term Functional Goals: Time Frame: 4 weeks   1. Ms. Bouchra Alejandra to be independent with HEP. 2. Pt able to have no pain with ambulation or at rest 100% of time. 3. Pt to demo full HS stretch without pain in the back of the knee. Discharge Goals: Time Frame: 8 weeks   1. Pt able to return to running painfree in the R knee 100% of time. 2. Pt able to demo normal gym activities without limtiations 100% of time. Rehabilitation Potential For Stated Goals: Excellent                HISTORY:   History of Present Injury/Illness (Reason for Referral):  Pt 19 y/o F with pain in the R knee that started after running in January. She ran a marathon back in October and had been in therapy previously for the other knee. Pt rested for a solid 2 weeks and the knee starting feeling better. She started having the pain in the front of the knee near the VMO and she now feels it more toward the medial and inside hamstring tendon. Pt stated he does not feel it with with biking but does feel it some with walking as a more strained feeling. Pt is not having any swelling but is scheduled for an MRI on Monday. The doctor wanted to give an injection but wasn't sure if it was quad or hamstring at this time. Past Medical History/Comorbidities:   Ms. Campos Schaefer  has no past medical history on file. Ms. Campos Schaefer  has no past surgical history on file. Social History/Living Environment:     Lives on campus   Prior Level of Function/Work/Activity:  Student at [address] [place] [address] Side:         RIGHT  Current Medications:  No current outpatient medications on file. Date Last Reviewed:  4/11/2019   EXAMINATION:   Observation/Orthostatic Postural Assessment:          Good   Palpation:          Tenderness over the gracilis and VMO   ROM:     No complaint of ROM concern      Strength:     L knee      Flexion: 5/5     Extension: 5/5    R knee      Flexion: 5/5     Extension: 5/5    Special Tests:          Lachmans (-), Grind (-), McMurrays (-)  Neurological Screen:        Sensation: no complaint of numbness or tingling   Functional Mobility:         Independent   Balance:          Good    Body Structures Involved:  1. Muscles  2. Ligaments Body Functions Affected:  1. Movement Related Activities and Participation Affected:  1.  Mobility   CLINICAL PRESENTATION:   CLINICAL DECISION MAKING:   Tool Used: VAS pain scale : 5/10     Medical Necessity:   · Patient is expected to demonstrate progress in strength and balance to increase independence with functional and recreational activities painfree. .  Reason for Services/Other Comments:  · Patient continues to require present interventions due to patient's inability to perform functional activities painfree. .   TREATMENT:   (In addition to Assessment/Re-Assessment sessions the following treatments were rendered)      THERAPEUTIC EXERCISE: (see chart below for  minutes):  Exercises per grid below to improve mobility, strength and balance. Required minimal verbal cues to promote proper body alignment, promote proper body posture and promote proper body mechanics. Progressed resistance, range, repetitions and complexity of movement as indicated. MANUAL THERAPY: (see chart below for  minutes): Joint mobilization and Soft tissue mobilization was utilized and necessary because of the patient's restricted joint motion and restricted motion of soft tissue. MODALITIES: (see chart below for  minutes):      see chart below for pain managment. Date: 2-20-19  (EVAL) 2-27-19  (Visit 2)  3-15-19  (Visit 3)  3-22-19  (Visit 4)  3-27-19  (Visit 5)  3-29-19  (Visit 6)  4-4-19  (Visit 7)  4-11-19  (Visit 8)    Modalities:            Ice massage       5 mins  5 mins  5 mins                          Therapeutic Exercise: 10 mins  30 mins  30 mins  30 mins  30 mins  25 mins  35 mins  30 mins    HS stretch  With active stretching, AP for nerve glides and using strap for static  Repeat  Repeat  Repeat  Repeat  Repeat  Repeat  Repeat walking    Resisted side stepping   Green band with push out kettlebell 2L knees bent  Repeat  Repeat  Repeat blue band  Repeat  Repeat  Grey band 2L    RDL walking   Red kettlebell  Forward and back with high knee and lunge 2L  Repeat  Repeat  Repeat  Repeat with lunge and twist 2L 8#  Repeat     Bike   5 mins  Repeat  5 mins Repeat  5 mins  Repeat  Repeat    Walking warm up   1L each  Repeat  2L  Repeat  Repeat  Repeat  Repeat    Leg press    # 3x8 120# 3x10  120# x15  140# x 10  160# x 6 Repeat same weight      Proprioceptive Activities:           Toe tapping on foam    3 way x10 each     RDL on foam 2x10 with high knee                           Manual Therapy: 15 mins  15 mins  15 mins  15 mins  15 mins  15mins  10 mins  10 mins    STM with dry needling  15 mins  15 mins  15 mins  15 mins  15 mins VMO and medial quad  Repeat  10 mins  5 mins               Therapeutic Activities:           Eccentric heel drop     6 in 3x10  Repeat    10 in step 2x5 bilateral    Side lunges and reverse     2x10  Repeat with 8#   Added in walking lunge with twist 2L  Repeat with twist bilaterally 2L Repeat  With sliders 2L    SL squat with slider     2x10  repeat  Walking lunges with slider and 8# 2L     Knee extension machine      Eccentric drop 35# 2x10  Repeat      bosu step up with high knee      2x10  Repeat with 3SH  Side stepping leaps 2x10 with squat    HS curls with SB         2x15    Fire hydrant/donkey kick         Grey band 2x10 bilateral    bosu squat         2x15    Jump down/landing with hop      20 in step x5  Repeat     bungees        Running on foam in place  Running forward     SL modified chair squat      Red kettlebell 3x10       HEP: pt was given stretching and ice massage for home use as well as manual massage for the VMO and hamstring performed independently   [facility] Portal  Treatment/Session Assessment:  Pt is showing improvement with strength and tolerating higher level of strengthening and will continue with VMO training but she is still going for a second opinion due to the doctor suggesting she have surgery to remove the plica. Pt to continue with 1 time a week for the remainder of the month and then discharge to HEP.      · Pain/ Symptoms: Initial: \"I went for the MRI and the doctor said I dont have chondromalacia, I have plica. \" 0/10  Post Session:  No increase in pain following session. ·   Compliance with Program/Exercises: Will assess as treatment progresses. · Recommendations/Intent for next treatment session: \"Next visit will focus on advancements to more challenging activities\".   Total Treatment Duration:  PT Patient Time In/Time Out  Time In: 1145  Time Out: [address] [place] Lester, PT, DPT

## 2019-04-16 ENCOUNTER — HOSPITAL ENCOUNTER (OUTPATIENT)
Dept: PHYSICAL THERAPY | Age: 19
Discharge: HOME | End: 2019-04-16
Payer: COMMERCIAL

## 2019-04-16 PROCEDURE — 97140 MANUAL THERAPY 1/> REGIONS: CPT

## 2019-04-16 PROCEDURE — 97110 THERAPEUTIC EXERCISES: CPT

## 2019-04-16 NOTE — PROGRESS NOTES
Clay Hinton  : 2000 [address]  [address] [place] [address].  [phone]   [phone]        OUTPATIENT PHYSICAL THERAPY:Daily Note 2019         ICD-10: Treatment Diagnosis: Pain in right knee (M25.561) ,   Precautions/Allergies:   Patient has no allergy information on record. MD Orders: Eval and Treat  MEDICAL/REFERRING DIAGNOSIS:  Right knee pain [M25.561]   DATE OF ONSET: 3 weeks ago   REFERRING PHYSICIAN: Siffri, Melonie Schwab, MD  RETURN PHYSICIAN APPOINTMENT: tbd      INITIAL ASSESSMENT:  Ms. Cari Devi presents to therapy with pain in the R knee that started a few months ago and has gotten worse. Pt is having decreased endurance due to the pain and is not able to perform any gym activities or running without the pain. Pt would benefit from skilled PT for above deficits following an MRI next week to return to prior level of function painfree. PROBLEM LIST (Impacting functional limitations):  1. Decreased Strength  2. Decreased Ambulation Ability/Technique  3. Decreased Balance  4. Increased Pain  5. Decreased Activity Tolerance INTERVENTIONS PLANNED:  1. Balance Exercise  2. Cold  3. Electrical Stimulation  4. Heat  5. Home Exercise Program (HEP)  6. Iontophoresis  7. Manual Therapy  8. Therapeutic Activites  9. Therapeutic Exercise/Strengthening  10. Ultrasound (US)  11. Whirlpool  12. Dry needling    TREATMENT PLAN:  Effective Dates: 2019 TO 2019 (60 days). Frequency/Duration: 2 times a week for 60 Days  GOALS: (Goals have been discussed and agreed upon with patient.)  Short-Term Functional Goals: Time Frame: 4 weeks   1. Ms. Cari Devi to be independent with HEP. 2. Pt able to have no pain with ambulation or at rest 100% of time. 3. Pt to demo full HS stretch without pain in the back of the knee. Discharge Goals: Time Frame: 8 weeks   1. Pt able to return to running painfree in the R knee 100% of time. 2. Pt able to demo normal gym activities without limtiations 100% of time. Rehabilitation Potential For Stated Goals: Excellent                HISTORY:   History of Present Injury/Illness (Reason for Referral):  Pt 19 y/o F with pain in the R knee that started after running in January. She ran a marathon back in October and had been in therapy previously for the other knee. Pt rested for a solid 2 weeks and the knee starting feeling better. She started having the pain in the front of the knee near the VMO and she now feels it more toward the medial and inside hamstring tendon. Pt stated he does not feel it with with biking but does feel it some with walking as a more strained feeling. Pt is not having any swelling but is scheduled for an MRI on Monday. The doctor wanted to give an injection but wasn't sure if it was quad or hamstring at this time. Past Medical History/Comorbidities:   Ms. Dago Ramirez  has no past medical history on file. Ms. Dago Ramirez  has no past surgical history on file. 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 Date Last Reviewed:  4/16/2019   EXAMINATION:   Observation/Orthostatic Postural Assessment:          Good   Palpation:          Tenderness over the gracilis and VMO   ROM:     No complaint of ROM concern      Strength:     L knee      Flexion: 5/5     Extension: 5/5    R knee      Flexion: 5/5     Extension: 5/5    Special Tests:          Lachmans (-), Grind (-), McMurrays (-)  Neurological Screen:        Sensation: no complaint of numbness or tingling   Functional Mobility:         Independent   Balance:          Good    Body Structures Involved:  1. Muscles  2. Ligaments Body Functions Affected:  1. Movement Related Activities and Participation Affected:  1.  Mobility   CLINICAL PRESENTATION:   CLINICAL DECISION MAKING:   Tool Used: VAS pain scale : 5/10     Medical Necessity:   · Patient is expected to demonstrate progress in strength and balance to increase independence with functional and recreational activities painfree. .  Reason for Services/Other Comments:  · Patient continues to require present interventions due to patient's inability to perform functional activities painfree. .   TREATMENT:   (In addition to Assessment/Re-Assessment sessions the following treatments were rendered)      THERAPEUTIC EXERCISE: (see chart below for  minutes):  Exercises per grid below to improve mobility, strength and balance. Required minimal verbal cues to promote proper body alignment, promote proper body posture and promote proper body mechanics. Progressed resistance, range, repetitions and complexity of movement as indicated. MANUAL THERAPY: (see chart below for  minutes): Joint mobilization and Soft tissue mobilization was utilized and necessary because of the patient's restricted joint motion and restricted motion of soft tissue. MODALITIES: (see chart below for  minutes):      see chart below for pain managment. Date: 2-20-19  (EVAL) 2-27-19  (Visit 2)  3-15-19  (Visit 3)  3-22-19  (Visit 4)  3-27-19  (Visit 5)  3-29-19  (Visit 6)  4-4-19  (Visit 7)  4-11-19  (Visit 8)  4-16-19  (Visit 9)    Modalities:             Ice massage       5 mins  5 mins  5 mins                             Therapeutic Exercise: 10 mins  30 mins  30 mins  30 mins  30 mins  25 mins  35 mins  30 mins  30 mins    HS stretch  With active stretching, AP for nerve glides and using strap for static  Repeat  Repeat  Repeat  Repeat  Repeat  Repeat  Repeat walking  Repeat    Resisted side stepping   Green band with push out kettlebell 2L knees bent  Repeat  Repeat  Repeat blue band  Repeat  Repeat  Grey band 2L  Repeat    RDL walking   Red kettlebell  Forward and back with high knee and lunge 2L  Repeat  Repeat  Repeat  Repeat with lunge and twist 2L 8#  Repeat      Bike   5 mins  Repeat  5 mins  Repeat  5 mins  Repeat  Repeat     Walking warm up   1L each  Repeat  2L  Repeat  Repeat  Repeat  Repeat  Repeat    Leg press    # 3x8 120# 3x10  120# x15  140# x 10  160# x 6 Repeat same weight       Proprioceptive Activities:            Toe tapping on foam    3 way x10 each     RDL on foam 2x10 with high knee      Side leaping with static holds          x10 bilateral with 3SH                Manual Therapy: 15 mins  15 mins  15 mins  15 mins  15 mins  15mins  10 mins  10 mins  15 mins    STM with dry needling  15 mins  15 mins  15 mins  15 mins  15 mins VMO and medial quad  Repeat  10 mins  5 mins  15 mins bilateral STM knees                Therapeutic Activities:            Eccentric heel drop     6 in 3x10  Repeat    10 in step 2x5 bilateral     Side lunges and reverse     2x10  Repeat with 8#   Added in walking lunge with twist 2L  Repeat with twist bilaterally 2L Repeat  With sliders 2L  SL squat with TRX 2x10 bilateral    SL squat with slider     2x10  repeat  Walking lunges with slider and 8# 2L   Modified SL squat with chair and 2 foam    Knee extension machine      Eccentric drop 35# 2x10  Repeat       bosu step up with high knee      2x10  Repeat with 3SH  Side stepping leaps 2x10 with squat     HS curls with SB         2x15  Repeat    Fire hydrant/donkey kick         Grey band 2x10 bilateral     bosu squat         2x15  Repeat    Jump down/landing with hop      20 in step x5  Repeat   Repeat    bungees        Running on foam in place  Running forward   TRX SL squat 2x10    SL modified chair squat      Red kettlebell 3x10        HEP: pt was given stretching and ice massage for home use as well as manual massage for the VMO and hamstring performed independently   [facility] Portal  Treatment/Session Assessment:  Pt is awaiting surgery on the R knee for debridement and will continue with strengthening until discharge to [facility].      · Pain/ Symptoms: Initial: \"My L knee is bothering me in the patellar region. \" 2/10  Post Session:  No increase in pain following session. ·   Compliance with Program/Exercises: Will assess as treatment progresses. · Recommendations/Intent for next treatment session: \"Next visit will focus on advancements to more challenging activities\".   Total Treatment Duration:  PT Patient Time In/Time Out  Time In: 1145  Time Out: [address], PT, DPT

## 2019-04-25 ENCOUNTER — HOSPITAL ENCOUNTER (OUTPATIENT)
Dept: PHYSICAL THERAPY | Age: 19
Discharge: HOME | End: 2019-04-25
Payer: COMMERCIAL

## 2019-04-25 PROCEDURE — 97110 THERAPEUTIC EXERCISES: CPT

## 2019-04-25 PROCEDURE — 97140 MANUAL THERAPY 1/> REGIONS: CPT

## 2019-04-25 NOTE — THERAPY DISCHARGE
Teri Michel  : 2000 [address] [facility] [place] [address].  [phone]   [phone]        OUTPATIENT PHYSICAL THERAPY:Daily Note and Discharge 2019         ICD-10: Treatment Diagnosis: Pain in right knee (M25.561) ,   Precautions/Allergies:   Patient has no allergy information on record. MD Orders: Eval and Treat  MEDICAL/REFERRING DIAGNOSIS:  Right knee pain [M25.561]   DATE OF ONSET: 3 weeks ago   REFERRING PHYSICIAN: Gisela Bridges MD  RETURN PHYSICIAN APPOINTMENT: tbd      INITIAL ASSESSMENT:  Ms. Danish Lemon presents to therapy with pain in the R knee that started a few months ago and has gotten worse. Pt is having decreased endurance due to the pain and is not able to perform any gym activities or running without the pain. Pt would benefit from skilled PT for above deficits following an MRI next week to return to prior level of function painfree. PROBLEM LIST (Impacting functional limitations):  1. Decreased Strength  2. Decreased Ambulation Ability/Technique  3. Decreased Balance  4. Increased Pain  5. Decreased Activity Tolerance INTERVENTIONS PLANNED:  1. Balance Exercise  2. Cold  3. Electrical Stimulation  4. Heat  5. Home Exercise Program (HEP)  6. Iontophoresis  7. Manual Therapy  8. Therapeutic Activites  9. Therapeutic Exercise/Strengthening  10. Ultrasound (US)  11. Whirlpool  12. Dry needling    TREATMENT PLAN:  Effective Dates: 2019 TO 2019 (60 days). Frequency/Duration: 2 times a week for 60 Days  GOALS: (Goals have been discussed and agreed upon with patient.)  Short-Term Functional Goals: Time Frame: 4 weeks   1. Ms. Danish Lemon to be independent with HEP. (MET)  2. Pt able to have no pain with ambulation or at rest 100% of time. (Not met)   3. Pt to demo full HS stretch without pain in the back of the knee. (MET)  Discharge Goals: Time Frame: 8 weeks   1.  Pt able to return to running painfree in the R knee 100% of time. (Not met fully but pt has returned to running)   2. Pt able to demo normal gym activities without limtiations 100% of time. (MET)  Rehabilitation Potential For Stated Goals: Excellent                HISTORY:   History of Present Injury/Illness (Reason for Referral):  Pt 19 y/o F with pain in the R knee that started after running in January. She ran a marathon back in October and had been in therapy previously for the other knee. Pt rested for a solid 2 weeks and the knee starting feeling better. She started having the pain in the front of the knee near the VMO and she now feels it more toward the medial and inside hamstring tendon. Pt stated he does not feel it with with biking but does feel it some with walking as a more strained feeling. Pt is not having any swelling but is scheduled for an MRI on Monday. The doctor wanted to give an injection but wasn't sure if it was quad or hamstring at this time. Past Medical History/Comorbidities:   Ms. Justin Varma  has no past medical history on file. Ms. Justin Varma  has no past surgical history on file. Social History/Living Environment:     Lives on campus   Prior Level of Function/Work/Activity:  Student at [address] [place] [address] Side:         RIGHT  Current Medications:  No current outpatient medications on file. Date Last Reviewed:  4/25/2019   EXAMINATION:   Observation/Orthostatic Postural Assessment:          Good   Palpation:          Tenderness over the gracilis and VMO   ROM:     No complaint of ROM concern      Strength:     L knee      Flexion: 5/5     Extension: 5/5    R knee      Flexion: 5/5     Extension: 5/5    Special Tests:          Lachmans (-), Grind (-), McMurrays (-)  Neurological Screen:        Sensation: no complaint of numbness or tingling   Functional Mobility:         Independent   Balance:          Good    Body Structures Involved:  1. Muscles  2. Ligaments Body Functions Affected:  1.  Movement Related Activities and Participation Affected:  1. Mobility   CLINICAL PRESENTATION:   CLINICAL DECISION MAKING:   Tool Used: VAS pain scale : 5/10     Medical Necessity:   · Patient is expected to demonstrate progress in strength and balance to increase independence with functional and recreational activities painfree. .  Reason for Services/Other Comments:  · Patient continues to require present interventions due to patient's inability to perform functional activities painfree. .   TREATMENT:   (In addition to Assessment/Re-Assessment sessions the following treatments were rendered)      THERAPEUTIC EXERCISE: (see chart below for  minutes):  Exercises per grid below to improve mobility, strength and balance. Required minimal verbal cues to promote proper body alignment, promote proper body posture and promote proper body mechanics. Progressed resistance, range, repetitions and complexity of movement as indicated. MANUAL THERAPY: (see chart below for  minutes): Joint mobilization and Soft tissue mobilization was utilized and necessary because of the patient's restricted joint motion and restricted motion of soft tissue. MODALITIES: (see chart below for  minutes):      see chart below for pain managment. Date: 2-20-19  (EVAL) 2-27-19  (Visit 2)  3-15-19  (Visit 3)  3-22-19  (Visit 4)  3-27-19  (Visit 5)  3-29-19  (Visit 6)  4-4-19  (Visit 7)  4-11-19  (Visit 8)  4-16-19  (Visit 9)  4-25-19  (Visit 10)    Modalities:              Ice massage       5 mins  5 mins  5 mins                                Therapeutic Exercise: 10 mins  30 mins  30 mins  30 mins  30 mins  25 mins  35 mins  30 mins  30 mins  30 mins    HS stretch  With active stretching, AP for nerve glides and using strap for static  Repeat  Repeat  Repeat  Repeat  Repeat  Repeat  Repeat walking  Repeat  Repeat    Resisted side stepping   Green band with push out kettlebell 2L knees bent  Repeat  Repeat  Repeat blue band  Repeat  Repeat  Grey band 2L  Repeat Repeat    RDL walking   Red kettlebell  Forward and back with high knee and lunge 2L  Repeat  Repeat  Repeat  Repeat with lunge and twist 2L 8#  Repeat    On foam green kettlebell 2x10    Bike   5 mins  Repeat  5 mins  Repeat  5 mins  Repeat  Repeat      Walking warm up   1L each  Repeat  2L  Repeat  Repeat  Repeat  Repeat  Repeat     Leg press    # 3x8 120# 3x10  120# x15  140# x 10  160# x 6 Repeat same weight        Proprioceptive Activities:             Toe tapping on foam    3 way x10 each     RDL on foam 2x10 with high knee       Side leaping with static holds          x10 bilateral with 3SH                  Manual Therapy: 15 mins  15 mins  15 mins  15 mins  15 mins  15mins  10 mins  10 mins  15 mins  15 mins    STM with dry needling  15 mins  15 mins  15 mins  15 mins  15 mins VMO and medial quad  Repeat  10 mins  5 mins  15 mins bilateral STM knees  15 posterior knee with gastroc with needling                Therapeutic Activities:             Eccentric heel drop     6 in 3x10  Repeat    10 in step 2x5 bilateral      Side lunges and reverse     2x10  Repeat with 8#   Added in walking lunge with twist 2L  Repeat with twist bilaterally 2L Repeat  With sliders 2L  SL squat with TRX 2x10 bilateral     SL squat with slider     2x10  repeat  Walking lunges with slider and 8# 2L   Modified SL squat with chair and 2 foam     Knee extension machine      Eccentric drop 35# 2x10  Repeat     Heel raises with machine seated 100# eccentric and SL 2x10 each   slantboard heel raises and stretch          3x10 with 60SH   bosu step up with high knee      2x10  Repeat with 3SH  Side stepping leaps 2x10 with squat      HS curls with SB         2x15  Repeat  Repeat    Fire hydrant/donkey kick         Grey band 2x10 bilateral      bosu squat         2x15  Repeat     Jump down/landing with hop      20 in step x5  Repeat   Repeat     bungees        Running on foam in place  Running forward   TRX SL squat 2x10     SL modified chair squat      Red kettlebell 3x10      SL bridging 3x10    HEP: pt was given stretching and ice massage for home use as well as manual massage for the VMO and hamstring performed independently   [employer] [facility] [employer] [facility] Portal  Treatment/Session Assessment: Pt decided to wait and not have the surgery. Pt is going to continue to train at home with HEP and continue to run. Pt is discharged. · Pain/ Symptoms: Initial:\"Its doing well and the doctor said I dont think I need to have the surgery. \"  Post Session:  No increase in pain following session.   ·     Total Treatment Duration:  PT Patient Time In/Time Out  Time In: 1145  Time Out: [address], PT, DPT

## 2019-05-09 ENCOUNTER — RX ONLY (RX ONLY)
Age: 19
End: 2019-05-09

## 2019-05-09 ENCOUNTER — RASH (OUTPATIENT)
Dept: URBAN - METROPOLITAN AREA CLINIC 31 | Facility: CLINIC | Age: 19
Setting detail: DERMATOLOGY
End: 2019-05-09

## 2019-05-09 DIAGNOSIS — L71.8 OTHER ROSACEA: ICD-10-CM

## 2019-05-09 DIAGNOSIS — L40.0 PSORIASIS VULGARIS: ICD-10-CM

## 2019-05-09 PROBLEM — L73.8 OTHER SPECIFIED FOLLICULAR DISORDERS: Status: ACTIVE | Noted: 2019-05-09

## 2019-05-09 PROBLEM — L73.8 OTHER SPECIFIED FOLLICULAR DISORDERS: Status: RESOLVED | Noted: 2019-05-09

## 2019-05-09 PROCEDURE — 99213 OFFICE O/P EST LOW 20 MIN: CPT

## 2019-05-09 RX ORDER — TRIAMCINOLONE ACETONIDE 1 MG/G
1 APPLICATION CREAM TOPICAL BID
Qty: 454 | Refills: 0
Start: 2019-05-09

## 2019-05-21 ENCOUNTER — FOLLOW UP (OUTPATIENT)
Dept: URBAN - METROPOLITAN AREA CLINIC 31 | Facility: CLINIC | Age: 19
Setting detail: DERMATOLOGY
End: 2019-05-21

## 2019-05-21 DIAGNOSIS — D48.5 NEOPLASM OF UNCERTAIN BEHAVIOR OF SKIN: ICD-10-CM

## 2019-05-21 PROBLEM — R21 RASH AND OTHER NONSPECIFIC SKIN ERUPTION: Status: RESOLVED | Noted: 2019-05-21

## 2019-05-21 PROBLEM — R21 RASH AND OTHER NONSPECIFIC SKIN ERUPTION: Status: ACTIVE | Noted: 2019-05-21

## 2019-05-21 PROCEDURE — 99213 OFFICE O/P EST LOW 20 MIN: CPT

## 2020-09-24 ENCOUNTER — HOSPITAL ENCOUNTER (OUTPATIENT)
Dept: PHYSICAL THERAPY | Age: 20
Discharge: HOME | End: 2020-09-24
Payer: COMMERCIAL

## 2020-09-24 PROCEDURE — 97140 MANUAL THERAPY 1/> REGIONS: CPT

## 2020-09-24 PROCEDURE — 97161 PT EVAL LOW COMPLEX 20 MIN: CPT

## 2020-09-24 PROCEDURE — 97110 THERAPEUTIC EXERCISES: CPT

## 2020-09-24 NOTE — PROGRESS NOTES
Claudia Hernandez  : 2000  Primary: Fidelia Manrique Pamelan  Secondary:  Jaya Garcia @ [address], [address] [place] [address]  [phone]   [phone]      OUTPATIENT PHYSICAL THERAPY: Daily Treatment Note  2020   Pain in right leg (M79.604), Pain in right hip (M25.551) and Pain in right knee (M25.561)  Therapy Diagnosis: R gluteal pain/ITB syndrome  Effective Dates: 2020 TO 2020 (90 days). Frequency/Duration: 2 times a week for 90 Day(s)  GOALS: (Goals have been discussed and agreed upon with patient.)  Short-Term Functional Goals: Time Frame: 4 weeks   1. Ms. Le Mathur to be independent with HEP. 2. Pt able to perform all exercises without complaint of pain in the glute or tightness. 3. Pt able to return to all functional activities without pain in the R leg and not waking up with LBP. Discharge Goals: Time Frame: 12 weeks   1. Pt able to return to running 4 miles at least 2 times a week painfree in the R leg. 2. Pt to return to prior level of function without limitations painfree in the R leg. 3. Pt to have at least 4+/5 overall strength in the lower extremity. _________________________________________________________________________  Pre-treatment Symptoms/Complaints: \"Im hurting mostly when I try to run. \"   Pain: Initial: 4/10 Post Session:  4/10   Medications Last Reviewed:  2020    Next MD appt: ania     Updated Objective Findings:  See evaluation note from today     TREATMENT:   THERAPEUTIC ACTIVITY: ( see chart below for minutes): Therapeutic activities per grid below to improve mobility and strength. Required minimal visual and verbal cues to promote dynamic balance in standing. THERAPEUTIC EXERCISE: (see chart below for minutes):  Exercises per grid below to improve mobility, strength, balance and coordination.   Required minimal visual and verbal cues to promote proper body alignment, promote proper body posture and promote proper body mechanics. Progressed resistance, range, repetitions and complexity of movement as indicated. MANUAL THERAPY: (see chart below for minutes): Joint mobilization and Soft tissue mobilization was utilized and necessary because of the patient's restricted joint motion, painful spasm and restricted motion of soft tissue. MODALITIES: (see chart below for minutes):      see chart below for details on pain management. SELF CARE: (see chart below for minutes): Procedure(s) (per grid) utilized to improve and/or restore self-care/home management as related to functional activities . Required minimal visual, verbal and   cueing to facilitate activities of daily living skills. Date: 9-24-20  (EVAL)        Modalities:                                Therapeutic Exercise: 10 mins        glute med stretch with figure 4 standing leg on table  2x10SH        Hip flexor stretch kneeling  2x10SH        Peroneal stretch with foot IR  2x10SH                                Proprioceptive Activities:                                Manual Therapy: 15 mins        Dry needling  R peroneal muscle x static positioning x 2 needles   R glute med/piriformis x 3 needles static with pistoning before removing                Therapeutic Activities:                                  HEP:  Pt was told to do the above stretches at home before and after running as well as ice massage for the peroneals. [facility] Portal  Treatment/Session Summary:    · Response to Treatment: Hip tightness and abnormal gait pattern with running may be cause of the pain in the R leg. Pt has peroneal tightness and tendinitis in the R lower leg and then tightness in the glutes on the R hip. Pt would benefit from dry needling, hip strengthening and working on stretching.    · Communication/Consultation:  None today  · Equipment provided today:  None today  · Recommendations/Intent for next treatment session: Next visit will focus on stretching, hip strengthening and dry needling .     Total Treatment Billable Duration:  45 mins   PT Patient Time In/Time Out  Time In: 0800  Time Out: Polly Rodney 53, PT, DPT    Future Appointments   Date Time Provider Akilah Huff   9/24/2020  8:00 AM Rei Godwin, PT, DPT [facility]   9/29/2020  8:00 AM Rei Godwin, PT, DPT [facility]   10/2/2020  8:00 AM Rei Godwin, PT, DPT [facility]   10/6/2020  8:00 AM Rei Godwin, PT, DPT [facility]   10/9/2020  8:00 AM Rei Godwin, PT, DPT [facility]   10/12/2020 11:00 AM Rei Godwin, PT, DPT [facility]   10/14/2020  8:00 AM Rei Godwin, PT, DPT [facility]   10/22/2020  8:00 AM Rei Godwin, PT, DPT [facility]   10/27/2020  8:00 AM Rei Godwin, PT, DPT [facility]   10/29/2020  8:00 AM Rei Godwin, PT, DPT [facility]

## 2020-09-24 NOTE — THERAPY EVALUATION
Rob Palmer : 2000 Primary: Misha Manrique Rpeliceo Secondary:  Keily Boyd @ [address] [place] [address] [phone]   [phone] OUTPATIENT PHYSICAL THERAPY:Initial Assessment 2020 ICD-10: Treatment Diagnosis: Pain in right leg (M79.604), Pain in right hip (M25.551) and Pain in right knee (M25.561) and Difficulty in walking, not elsewhere classified (R26.2) Precautions/Allergies:  
Patient has no allergy information on record. MD Orders: Eval and Treat Return MD Appointment: tbd  MEDICAL/REFERRING DIAGNOSIS: 
Low back pain [M54.5] Gluteal pain [M79.18] DATE OF ONSET: 2020 REFERRING PHYSICIAN: Akanksha Coughlin MD  
 
INITIAL ASSESSMENT:  Ms. Pato Fletcher presents to therapy with pain in the R gluteal muscle as well as down the ITB and into the lateral peroneals of the lower leg. Pt stated the pain started back in July and she notices it mostly when she is running. Pt had some pain in the bursa but that was gone with the medication and now she is having trouble returning to her normal running pace. Pt would benefit from skilled PT for above deficits to return to prior level of function painfree. PROBLEM LIST (Impacting functional limitations): 1. Decreased Strength 2. Decreased ADL/Functional Activities 3. Decreased Ambulation Ability/Technique 4. Decreased Balance 5. Increased Pain 6. Decreased Activity Tolerance 7. Decreased Flexibility/Joint Mobility INTERVENTIONS PLANNED: (Treatment may consist of any combination of the following) 1. Balance Exercise 2. Cold 3. Electrical Stimulation 4. Gait Training 5. Home Exercise Program (HEP) 6. Manual Therapy 7. Neuromuscular Re-education/Strengthening 8. Range of Motion (ROM) 9. Therapeutic Activites 10. Therapeutic Exercise/Strengthening 11. Dry needling TREATMENT PLAN: 
Effective Dates: 2020 TO 2020 (90 days).   Frequency/Duration: 2 times a week for 90 Day(s) GOALS: (Goals have been discussed and agreed upon with patient.) Short-Term Functional Goals: Time Frame: 4 weeks 1. Ms. Knowles Dopp to be independent with HEP. 2. Pt able to perform all exercises without complaint of pain in the glute or tightness. 3. Pt able to return to all functional activities without pain in the R leg and not waking up with LBP. Discharge Goals: Time Frame: 12 weeks 1. Pt able to return to running 4 miles at least 2 times a week painfree in the R leg. 2. Pt to return to prior level of function without limitations painfree in the R leg. 3. Pt to have at least 4+/5 overall strength in the lower extremity. OUTCOME MEASURE:  
Tool Used: VAS pain scale while running Score:  Initial: 4 Most Recent: X (Date: -- ) Interpretation of Score: 40% impaired Outcome Measure Conversion Site MEDICAL NECESSITY:  
· Patient is expected to demonstrate progress in strength, range of motion, balance, coordination and functional technique to increase independence with functional activities. . 
REASON FOR SERVICES/OTHER COMMENTS: 
· Patient continues to require present interventions due to patient's inability to perform functional activities painfree as prior level of function. · . Total Duration: PT Patient Time In/Time Out Time In: 0800 Time Out: 6614 Rehabilitation Potential For Stated Goals: Excellent Regarding [facility] therapy, I certify that the treatment plan above will be carried out by a therapist or under their direction. Thank you for this referral, 
Cait Chinchilla, PT, DPT Referring Physician Signature: Bsi, Not On File, MD _______________________________ Date _____________ PAIN/SUBJECTIVE:  
Initial:   4/10  Post Session:  4/10 HISTORY:  
History of Injury/Illness (Reason for Referral): 
  Pt 24 y/o F with pain in the ITB and the bursa in the R hip that started back in July. She stated the pain was with heel strike mostly when running but she has not been able to return to running since the pain started. Pt was put on Naproxen which helped the bursa but she is still having the pain down to the knee on the lateral side as well as some pain in the glute and hamstring on the R leg. Pt has had some LBP when waking up in the morning but she has not had any numbness and no symptoms down the foot. Pt stated the pain is mostly in the piriformis and down in the peroneals on the lower leg. Pt would like to get back to running 4 miles every other day which was prior level. Past Medical History/Comorbidities: Ms. Onel Alfaro  has no past medical history on file. Ms. Onel Alfaro  has no past surgical history on file. Social History/Living Environment:  
  Lives on campus Prior Level of Function/Work/Activity: 
Independent and student at [employer] Dominant Side:  
      RIGHT Other Clinical Tests:   
      Xray (-) Previous Treatment Approaches: PT back home prior to returning to school Ambulatory/Rehab Services H2 Model Falls Risk Assessment Risk Factors: 
     No Risk Factors Identified Ability to Rise from Chair: 
     (0)  Ability to rise in a single movement Falls Prevention Plan: No modifications necessary Total: (5 or greater = High Risk): 0  
©2010 [facility] of [place] [address] [place] [address] States Patent #[id number]. Federal Law prohibits the replication, distribution or use without written permission from [facility] of [address] [place] [address] Current Medications: No current outpatient medications on file. Date Last Reviewed:  9/24/2020 Number of Personal Factors/Comorbidities that affect the Plan of Care: 0: LOW COMPLEXITY EXAMINATION:  
ROM:   
      Pt is very flexible and only describes the pain as tightness in the glute area when running Strength:   
      glute med (4-) Hamstrings (4-) Quads (4-) Special Tests: Meng Valverde (-),   SLR (-), Neurological Screen: 
      Sensation: no complaint of numbness or tingling Balance:   
      Normal (without support) Sensation: WFL Body Structures Involved: 1. Nerves 2. Bones 3. Joints 4. Muscles 5. Ligaments Body Functions Affected: 1. Neuromusculoskeletal 
2. Movement Related Activities and Participation Affected: 1. General Tasks and Demands 2. Mobility Number of elements (examined above) that affect the Plan of Care: 4+: HIGH COMPLEXITY CLINICAL PRESENTATION:  
Presentation: Stable and uncomplicated: LOW COMPLEXITY CLINICAL DECISION MAKING:  
Use of outcome tool(s) and clinical judgement create a POC that gives a: Questionable prediction of patient's progress: MODERATE COMPLEXITY

## 2020-09-29 ENCOUNTER — HOSPITAL ENCOUNTER (OUTPATIENT)
Dept: PHYSICAL THERAPY | Age: 20
Discharge: HOME | End: 2020-09-29
Payer: COMMERCIAL

## 2020-09-29 PROCEDURE — 97110 THERAPEUTIC EXERCISES: CPT

## 2020-09-29 PROCEDURE — 97140 MANUAL THERAPY 1/> REGIONS: CPT

## 2020-09-29 NOTE — PROGRESS NOTES
Keith Arredondo  : 2000  Primary: Shanta Manrique Rpn  Secondary:  [address] [facility] @ [address], [facility] PRAVIN Schuster.  [phone]   [phone]      OUTPATIENT PHYSICAL THERAPY: Daily Treatment Note  2020   Pain in right leg (M79.604), Pain in right hip (M25.551) and Pain in right knee (M25.561)  Therapy Diagnosis: R gluteal pain/ITB syndrome  Effective Dates: 2020 TO 2020 (90 days). Frequency/Duration: 2 times a week for 90 Day(s)  GOALS: (Goals have been discussed and agreed upon with patient.)  Short-Term Functional Goals: Time Frame: 4 weeks   1. Ms. Cutler Ventura to be independent with HEP. 2. Pt able to perform all exercises without complaint of pain in the glute or tightness. 3. Pt able to return to all functional activities without pain in the R leg and not waking up with LBP. Discharge Goals: Time Frame: 12 weeks   1. Pt able to return to running 4 miles at least 2 times a week painfree in the R leg. 2. Pt to return to prior level of function without limitations painfree in the R leg. 3. Pt to have at least 4+/5 overall strength in the lower extremity. _________________________________________________________________________  Pre-treatment Symptoms/Complaints:  \"I ran this weekend and I was fine with the first run but the second one I had some diffuse pain in the side of the hip and down the leg. \"   Pain: Initial: 3/10 Post Session:  2/10   Medications Last Reviewed:  2020    Next MD appt: ania     Updated Objective Findings:  None Today     TREATMENT:   THERAPEUTIC ACTIVITY: ( see chart below for minutes): Therapeutic activities per grid below to improve mobility and strength. Required minimal visual and verbal cues to promote dynamic balance in standing. THERAPEUTIC EXERCISE: (see chart below for minutes):  Exercises per grid below to improve mobility, strength, balance and coordination.   Required minimal visual and verbal cues to promote proper body alignment, promote proper body posture and promote proper body mechanics. Progressed resistance, range, repetitions and complexity of movement as indicated. MANUAL THERAPY: (see chart below for minutes): Joint mobilization and Soft tissue mobilization was utilized and necessary because of the patient's restricted joint motion, painful spasm and restricted motion of soft tissue. MODALITIES: (see chart below for minutes):      see chart below for details on pain management. SELF CARE: (see chart below for minutes): Procedure(s) (per grid) utilized to improve and/or restore self-care/home management as related to functional activities . Required minimal visual, verbal and   cueing to facilitate activities of daily living skills. Date: 9-24-20  (EVAL)  9-29-20  (Visit 2)       Modalities:                                Therapeutic Exercise: 10 mins  20 mins       glute med stretch with figure 4 standing leg on table  2x10SH  Repeat       Hip flexor stretch kneeling  2x10SH  Repeat       Peroneal stretch with foot IR  2x10SH  Repeat       Resisted side stepping   3L with 1 knees straight and 2 knees bent      Walking warm up with hip circles and hamstring stretch  2l       Hamstring stretch with strap and into ITB stretch   2x15SH each       Reverse lunge on sliders   2x10 bilateral       SL clamshells with resistance band   2x20 bilateral       Butterfly bridge   2x10       Manual Therapy: 15 mins  25 mins       Dry needling  R peroneal muscle x static positioning x 2 needles   R glute med/piriformis x 3 needles static with pistoning before removing  25 mins R glute med and ITB region              Therapeutic Activities:                                  HEP:  Pt was told to do the above stretches at home before and after running as well as ice massage for the peroneals.      [employer] Portal  Treatment/Session Summary:    · Response to Treatment: Pt to continue with the POC and add in stretching and band walks as well as the butterfly bridge for HEP. · Communication/Consultation:  None today  · Equipment provided today:  None today  · Recommendations/Intent for next treatment session: Next visit will focus on stretching, hip strengthening and dry needling .     Total Treatment Billable Duration:  45 mins   PT Patient Time In/Time Out  Time In: 0800  Time Out: Polly Rodney 54, PT, DPT    Future Appointments   Date Time Provider Akilah Huff   10/2/2020  8:00 AM Helen Axon, PT, DPT [facility]   10/6/2020  8:00 AM Helen Axon, PT, DPT [facility]   10/9/2020  8:00 AM Helen Axon, PT, DPT [facility]   10/12/2020 11:00 AM Helen Axon, PT, DPT [facility]   10/14/2020  8:00 AM Helen Axon, PT, DPT [facility]   10/22/2020  8:00 AM Helen Axon, PT, DPT [facility]   10/27/2020  8:00 AM Helen Axon, PT, DPT [facility]   10/29/2020  8:00 AM Helen Axon, PT, DPT [facility]

## 2020-10-02 ENCOUNTER — HOSPITAL ENCOUNTER (OUTPATIENT)
Dept: PHYSICAL THERAPY | Age: 20
Discharge: HOME | End: 2020-10-02
Payer: COMMERCIAL

## 2020-10-02 PROCEDURE — 97110 THERAPEUTIC EXERCISES: CPT

## 2020-10-02 PROCEDURE — 97140 MANUAL THERAPY 1/> REGIONS: CPT

## 2020-10-02 NOTE — PROGRESS NOTES
Yue Alejandra  : 2000  Primary: Hubert Manrique Rpn  Secondary:  [address] [facility] @ [address], [address] [place] [address]  [phone]   [phone]      OUTPATIENT PHYSICAL THERAPY: Daily Treatment Note  10/2/2020   Pain in right leg (M79.604), Pain in right hip (M25.551) and Pain in right knee (M25.561)  Therapy Diagnosis: R gluteal pain/ITB syndrome  Effective Dates: 2020 TO 2020 (90 days). Frequency/Duration: 2 times a week for 90 Day(s)  GOALS: (Goals have been discussed and agreed upon with patient.)  Short-Term Functional Goals: Time Frame: 4 weeks   1. Ms. Stuart Marlow to be independent with HEP. 2. Pt able to perform all exercises without complaint of pain in the glute or tightness. 3. Pt able to return to all functional activities without pain in the R leg and not waking up with LBP. Discharge Goals: Time Frame: 12 weeks   1. Pt able to return to running 4 miles at least 2 times a week painfree in the R leg. 2. Pt to return to prior level of function without limitations painfree in the R leg. 3. Pt to have at least 4+/5 overall strength in the lower extremity. _________________________________________________________________________  Pre-treatment Symptoms/Complaints:  \"I did my run walk for about 2 miles 2 days ago and I didn't have any pinching but I felt like if I kept going I wouldve. \"   Pain: Initial: 1/10 Post Session:  1/10   Medications Last Reviewed:  10/2/2020    Next MD appt: ania     Updated Objective Findings:  None Today     TREATMENT:   THERAPEUTIC ACTIVITY: ( see chart below for minutes): Therapeutic activities per grid below to improve mobility and strength. Required minimal visual and verbal cues to promote dynamic balance in standing. THERAPEUTIC EXERCISE: (see chart below for minutes):  Exercises per grid below to improve mobility, strength, balance and coordination.   Required minimal visual and verbal cues to promote proper body alignment, promote proper body posture and promote proper body mechanics. Progressed resistance, range, repetitions and complexity of movement as indicated. MANUAL THERAPY: (see chart below for minutes): Joint mobilization and Soft tissue mobilization was utilized and necessary because of the patient's restricted joint motion, painful spasm and restricted motion of soft tissue. MODALITIES: (see chart below for minutes):      see chart below for details on pain management. SELF CARE: (see chart below for minutes): Procedure(s) (per grid) utilized to improve and/or restore self-care/home management as related to functional activities . Required minimal visual, verbal and   cueing to facilitate activities of daily living skills.      Date: 9-24-20  (EVAL)  9-29-20  (Visit 2)  10-2-20  (Visit 3)      Modalities:                                Therapeutic Exercise: 10 mins  20 mins  35 mins      glute med stretch with figure 4 standing leg on table  2x10SH  Repeat       Hip flexor stretch kneeling  2x10SH  Repeat       Peroneal stretch with foot IR  2x10SH  Repeat       Resisted side stepping   3L with 1 knees straight and 2 knees bent Repeat with green band      Walking warm up with hip circles and hamstring stretch  2l  Repeat      Hamstring stretch with strap and into ITB stretch   2x15SH each       Reverse lunge on sliders   2x10 bilateral       Modified chair squat SL    8# ball 2x10 bilateral to 2 foam     Hip extension resisted supine with cable    25# 2x10 bilateral      Resisted hip flexion with cable weight    25# 2x10 R leg      HS curls with SB    2x15      Walking lunge with twist    2L 8# ball      SL clamshells with resistance band   2x20 bilateral       Butterfly bridge   2x10       Manual Therapy: 15 mins  25 mins  10 mins      Dry needling  R peroneal muscle x static positioning x 2 needles   R glute med/piriformis x 3 needles static with pistoning before removing  25 mins R glute med and ITB region 10 mins R glute med and into the sartorius              Therapeutic Activities:                                  HEP:  Pt was told to do the above stretches at home before and after running as well as ice massage for the peroneals. [employer] Portal  Treatment/Session Summary:    · Response to Treatment: Pt to continue with POC and working on glute med. · Communication/Consultation:  None today  · Equipment provided today:  None today  · Recommendations/Intent for next treatment session: Next visit will focus on stretching, hip strengthening and dry needling .     Total Treatment Billable Duration:  45 mins   PT Patient Time In/Time Out  Time In: 0800  Time Out: [address] [facility], PT, DPT    Future Appointments   Date Time Provider Akilah Huff   10/6/2020  8:00 AM Luis E Fulton, PT, DPT [facility]   10/9/2020  8:00 AM Luis E Fulton, PT, DPT [facility]   10/12/2020 11:00 AM Luis E Fulton, PT, DPT [facility]   10/14/2020  8:00 AM Luis E Fulton, PT, DPT [facility]   10/22/2020  8:00 AM Luis E Fulton, PT, DPT [facility]   10/27/2020  8:00 AM Luis E Fulton, PT, DPT [facility]   10/29/2020  8:00 AM Luis E Fulton, PT, DPT [facility]

## 2020-10-06 ENCOUNTER — HOSPITAL ENCOUNTER (OUTPATIENT)
Dept: PHYSICAL THERAPY | Age: 20
Discharge: HOME | End: 2020-10-06
Payer: COMMERCIAL

## 2020-10-06 PROCEDURE — 97110 THERAPEUTIC EXERCISES: CPT

## 2020-10-06 PROCEDURE — 97140 MANUAL THERAPY 1/> REGIONS: CPT

## 2020-10-06 NOTE — PROGRESS NOTES
Kayla Ramos  : 2000  Primary: Fay Manrique Rpn  Secondary:  Jatinder Gallego @ [address] [address].  [phone]   [phone]      OUTPATIENT PHYSICAL THERAPY: Daily Treatment Note  10/6/2020   Pain in right leg (M79.604), Pain in right hip (M25.551) and Pain in right knee (M25.561)  Therapy Diagnosis: R gluteal pain/ITB syndrome  Effective Dates: 2020 TO 2020 (90 days). Frequency/Duration: 2 times a week for 90 Day(s)  GOALS: (Goals have been discussed and agreed upon with patient.)  Short-Term Functional Goals: Time Frame: 4 weeks   1. Ms. Martines Ro to be independent with HEP. 2. Pt able to perform all exercises without complaint of pain in the glute or tightness. 3. Pt able to return to all functional activities without pain in the R leg and not waking up with LBP. Discharge Goals: Time Frame: 12 weeks   1. Pt able to return to running 4 miles at least 2 times a week painfree in the R leg. 2. Pt to return to prior level of function without limitations painfree in the R leg. 3. Pt to have at least 4+/5 overall strength in the lower extremity. _________________________________________________________________________  Pre-treatment Symptoms/Complaints:  \"I ran yesterday and the butt felt fine but the outside of the L knee was bothering me. \"   Pain: Initial: 1/10 Post Session:  1/10   Medications Last Reviewed:  10/6/2020    Next MD appt: ania     Updated Objective Findings:  None Today     TREATMENT:   THERAPEUTIC ACTIVITY: ( see chart below for minutes): Therapeutic activities per grid below to improve mobility and strength. Required minimal visual and verbal cues to promote dynamic balance in standing. THERAPEUTIC EXERCISE: (see chart below for minutes):  Exercises per grid below to improve mobility, strength, balance and coordination.   Required minimal visual and verbal cues to promote proper body alignment, promote proper body posture and promote proper body mechanics. Progressed resistance, range, repetitions and complexity of movement as indicated. MANUAL THERAPY: (see chart below for minutes): Joint mobilization and Soft tissue mobilization was utilized and necessary because of the patient's restricted joint motion, painful spasm and restricted motion of soft tissue. MODALITIES: (see chart below for minutes):      see chart below for details on pain management. SELF CARE: (see chart below for minutes): Procedure(s) (per grid) utilized to improve and/or restore self-care/home management as related to functional activities . Required minimal visual, verbal and   cueing to facilitate activities of daily living skills.      Date: 9-24-20  (EVAL)  9-29-20  (Visit 2)  10-2-20  (Visit 3)  10-6-20  (Visit 4)     Modalities:                                Therapeutic Exercise: 10 mins  20 mins  35 mins  35 mins     glute med stretch with figure 4 standing leg on table  2x10SH  Repeat   Repeat     Hip flexor stretch kneeling  2x10SH  Repeat       Peroneal stretch with foot IR  2x10SH  Repeat       Resisted side stepping   3L with 1 knees straight and 2 knees bent Repeat with green band  Repeat     Walking warm up with hip circles and hamstring stretch  2l  Repeat  Repeat     Hamstring stretch with strap and into ITB stretch   2x15SH each   Repeat     Side lunge on sliders     2x10     Reverse lunge on sliders   2x10 bilateral   Repeat     Modified chair squat SL    8# ball 2x10 bilateral to 2 foam     Hip extension resisted supine with cable    25# 2x10 bilateral  45# repeat     Resisted hip flexion with cable weight    25# 2x10 R leg      HS curls with SB    2x15  2x15     Resisted donkey kick     Blue band 3x10     SL bridge     2x10     Walking lunge with twist    2L 8# ball  Repeat     SL clamshells with resistance band   2x20 bilateral   Repeat     Butterfly bridge   2x10   Repeat     Manual Therapy: 15 mins  25 mins  10 mins  10 mins     Dry needling  R peroneal muscle x static positioning x 2 needles   R glute med/piriformis x 3 needles static with pistoning before removing  25 mins R glute med and ITB region 10 mins R glute med and into the sartorius  10 mins L lateral quad near the knee followed by STM             Therapeutic Activities:                                  HEP:  Pt was told to do the above stretches at home before and after running as well as ice massage for the peroneals. [facility] Portal  Treatment/Session Summary:    · Response to Treatment: Pt to continue with POC and working on glute med. Added in donkey kicks with resistance to HEP. · Communication/Consultation:  None today  · Equipment provided today:  None today  · Recommendations/Intent for next treatment session: Next visit will focus on stretching, hip strengthening and dry needling .     Total Treatment Billable Duration:  45 mins   PT Patient Time In/Time Out  Time In: 0800  Time Out: Polly Rodney 54, PT, DPT    Future Appointments   Date Time Provider Akilah Huff   10/9/2020  8:00 AM Simi Fuller, PT, DPT [facility]   10/12/2020 11:00 AM Simi Fuller, PT, DPT [facility]   10/14/2020  8:00 AM Simi Fuller, PT, DPT [facility]   10/22/2020  8:00 AM Simi Fuller, PT, DPT [facility]   10/27/2020  8:00 AM Simi Fuller, PT, DPT [facility]   10/29/2020  8:00 AM Simi Fuller, PT, DPT [facility]

## 2020-10-09 ENCOUNTER — HOSPITAL ENCOUNTER (OUTPATIENT)
Dept: PHYSICAL THERAPY | Age: 20
Discharge: HOME | End: 2020-10-09
Payer: COMMERCIAL

## 2020-10-09 PROCEDURE — 97140 MANUAL THERAPY 1/> REGIONS: CPT

## 2020-10-09 PROCEDURE — 97110 THERAPEUTIC EXERCISES: CPT

## 2020-10-09 NOTE — PROGRESS NOTES
Onur Ramirez  : 2000  Primary: Henrietta Manrique Kavya  Secondary:  [facility] @ [address], [facility].  [phone]   [phone]      OUTPATIENT PHYSICAL THERAPY: Daily Treatment Note  10/9/2020   Pain in right leg (M79.604), Pain in right hip (M25.551) and Pain in right knee (M25.561)  Therapy Diagnosis: R gluteal pain/ITB syndrome  Effective Dates: 2020 TO 2020 (90 days). Frequency/Duration: 2 times a week for 90 Day(s)  GOALS: (Goals have been discussed and agreed upon with patient.)  Short-Term Functional Goals: Time Frame: 4 weeks   1. Ms. Monika Gonzalez to be independent with HEP. 2. Pt able to perform all exercises without complaint of pain in the glute or tightness. 3. Pt able to return to all functional activities without pain in the R leg and not waking up with LBP. Discharge Goals: Time Frame: 12 weeks   1. Pt able to return to running 4 miles at least 2 times a week painfree in the R leg. 2. Pt to return to prior level of function without limitations painfree in the R leg. 3. Pt to have at least 4+/5 overall strength in the lower extremity. _________________________________________________________________________  Pre-treatment Symptoms/Complaints: \"Im feeling good. The knee bothered me a little at the end of the run and the butt was a little pinch at the end but the foot started hurting me a little bit more. I ordered new shoes. \"   Pain: Initial: 1/10 Post Session:  1/10   Medications Last Reviewed:  10/9/2020    Next MD appt: ania     Updated Objective Findings:  None Today     TREATMENT:   THERAPEUTIC ACTIVITY: ( see chart below for minutes): Therapeutic activities per grid below to improve mobility and strength. Required minimal visual and verbal cues to promote dynamic balance in standing.   THERAPEUTIC EXERCISE: (see chart below for minutes):  Exercises per grid below to improve mobility, strength, balance and coordination. Required minimal visual and verbal cues to promote proper body alignment, promote proper body posture and promote proper body mechanics. Progressed resistance, range, repetitions and complexity of movement as indicated. MANUAL THERAPY: (see chart below for minutes): Joint mobilization and Soft tissue mobilization was utilized and necessary because of the patient's restricted joint motion, painful spasm and restricted motion of soft tissue. MODALITIES: (see chart below for minutes):      see chart below for details on pain management. SELF CARE: (see chart below for minutes): Procedure(s) (per grid) utilized to improve and/or restore self-care/home management as related to functional activities . Required minimal visual, verbal and   cueing to facilitate activities of daily living skills.      Date: 9-24-20  (EVAL)  9-29-20  (Visit 2)  10-2-20  (Visit 3)  10-6-20  (Visit 4)  10-9-20  (Visit 5)    Modalities:                                Therapeutic Exercise: 10 mins  20 mins  35 mins  35 mins  30 mins    glute med stretch with figure 4 standing leg on table  2x10SH  Repeat   Repeat  Repeat    Hip flexor stretch kneeling  2x10SH  Repeat    Repeat    Peroneal stretch with foot IR  2x10SH  Repeat       Resisted side stepping   3L with 1 knees straight and 2 knees bent Repeat with green band  Repeat  Repeat with blue band    Bike      5 mins    Walking warm up with hip circles and hamstring stretch  2l  Repeat  Repeat  Repeat    Hamstring stretch with strap and into ITB stretch   2x15SH each   Repeat  Repeat    Side lunge on sliders     2x10     Reverse lunge on sliders   2x10 bilateral   Repeat     Eccentrics with leg extension machine      x10 bilateral 50#    Modified chair squat SL    8# ball 2x10 bilateral to 2 foam     Hip extension resisted supine with cable    25# 2x10 bilateral  45# repeat     Resisted hip flexion with cable weight    25# 2x10 R leg   15# all 4 directions x 10 with L leg    HS curls with SB    2x15  2x15  With eccentrics x10    Resisted donkey kick     Blue band 3x10  Repeat    SL bridge     2x10  Repeat    Leg press      SL 80# 2x8   # x12  160# x10  180# x5   Walking lunge with twist    2L 8# ball  Repeat  Repeat    SL clamshells with resistance band   2x20 bilateral   Repeat     Butterfly bridge   2x10   Repeat  2x15    Manual Therapy: 15 mins  25 mins  10 mins  10 mins  15 mins    Dry needling  R peroneal muscle x static positioning x 2 needles   R glute med/piriformis x 3 needles static with pistoning before removing  25 mins R glute med and ITB region 10 mins R glute med and into the sartorius  10 mins L lateral quad near the knee followed by STM  15 mins R glute med            Therapeutic Activities:                                  HEP:  Pt was told to do the above stretches at home before and after running as well as ice massage for the peroneals. [employer] [facility]  Treatment/Session Summary:    · Response to Treatment: Pt to continue with POC and working on glute med strengthening and stretching. Pt is purchasing new shoes over the weekend and will see if this helps the foot pain and if not then she is recommended to purchase inserts to help with slight pronation. · Communication/Consultation:  None today  · Equipment provided today:  None today  · Recommendations/Intent for next treatment session: Next visit will focus on stretching, hip strengthening and dry needling .     Total Treatment Billable Duration:  45 mins   PT Patient Time In/Time Out  Time In: 0800  Time Out: Polly Rodney 53, PT, DPT    Future Appointments   Date Time Provider Akilah Huff   10/12/2020 11:00 AM Talib Mckeon PT, DPT [facility]   10/14/2020  8:00 AM Talib Mckeon PT, DPT [facility]   10/22/2020  8:00 AM Talib Mckeon PT, DPT [facility]   10/27/2020  8:00 AM Talib Mckeon PT, DPT [facility]   10/29/2020 8:00 AM Bharati Hallman, PT, DPT [facility]

## 2020-10-12 ENCOUNTER — HOSPITAL ENCOUNTER (OUTPATIENT)
Dept: PHYSICAL THERAPY | Age: 20
Discharge: HOME | End: 2020-10-12
Payer: COMMERCIAL

## 2020-10-12 PROCEDURE — 97140 MANUAL THERAPY 1/> REGIONS: CPT

## 2020-10-12 PROCEDURE — 97110 THERAPEUTIC EXERCISES: CPT

## 2020-10-12 NOTE — PROGRESS NOTES
Chandni Buckner  : 2000  Primary: Martín Manrique Kavya  Secondary:  [facility] @ [address] [place] [address].  [phone]   [phone]      OUTPATIENT PHYSICAL THERAPY: Daily Treatment Note  10/12/2020   Pain in right leg (M79.604), Pain in right hip (M25.551) and Pain in right knee (M25.561)  Therapy Diagnosis: R gluteal pain/ITB syndrome  Effective Dates: 2020 TO 2020 (90 days). Frequency/Duration: 2 times a week for 90 Day(s)  GOALS: (Goals have been discussed and agreed upon with patient.)  Short-Term Functional Goals: Time Frame: 4 weeks   1. Ms. Winkler Cousins to be independent with HEP. 2. Pt able to perform all exercises without complaint of pain in the glute or tightness. 3. Pt able to return to all functional activities without pain in the R leg and not waking up with LBP. Discharge Goals: Time Frame: 12 weeks   1. Pt able to return to running 4 miles at least 2 times a week painfree in the R leg. 2. Pt to return to prior level of function without limitations painfree in the R leg. 3. Pt to have at least 4+/5 overall strength in the lower extremity. _________________________________________________________________________  Pre-treatment Symptoms/Complaints:  \"I had a really bad run yesterday and my butt and the side of the knee was hurting. Im waiting on my new shoes to come in.\"   Pain: Initial: 3/10  Post Session:  1/10   Medications Last Reviewed:  10/12/2020    Next MD appt: ania     Updated Objective Findings:  None Today     TREATMENT:   THERAPEUTIC ACTIVITY: ( see chart below for minutes): Therapeutic activities per grid below to improve mobility and strength. Required minimal visual and verbal cues to promote dynamic balance in standing. THERAPEUTIC EXERCISE: (see chart below for minutes):  Exercises per grid below to improve mobility, strength, balance and coordination.   Required minimal visual and verbal cues to promote proper body alignment, promote proper body posture and promote proper body mechanics. Progressed resistance, range, repetitions and complexity of movement as indicated. MANUAL THERAPY: (see chart below for minutes): Joint mobilization and Soft tissue mobilization was utilized and necessary because of the patient's restricted joint motion, painful spasm and restricted motion of soft tissue. MODALITIES: (see chart below for minutes):      see chart below for details on pain management. SELF CARE: (see chart below for minutes): Procedure(s) (per grid) utilized to improve and/or restore self-care/home management as related to functional activities . Required minimal visual, verbal and   cueing to facilitate activities of daily living skills.      Date: 9-24-20  (EVAL)  9-29-20  (Visit 2)  10-2-20  (Visit 3)  10-6-20  (Visit 4)  10-9-20  (Visit 5)  10-12-20  (Visit 6)    Modalities:                                    Therapeutic Exercise: 10 mins  20 mins  35 mins  35 mins  30 mins  15 mins    glute med stretch with figure 4 standing leg on table  2x10SH  Repeat   Repeat  Repeat  Repeat    Hip flexor stretch kneeling  2x10SH  Repeat    Repeat  Repeat    Peroneal stretch with foot IR  2x10SH  Repeat        Resisted side stepping   3L with 1 knees straight and 2 knees bent Repeat with green band  Repeat  Repeat with blue band     Bike      5 mins  5 mins    Walking warm up with hip circles and hamstring stretch  2l  Repeat  Repeat  Repeat  Repeat    Hamstring stretch with strap and into ITB stretch   2x15SH each   Repeat  Repeat  Repeat    Side lunge on sliders     2x10      Reverse lunge on sliders   2x10 bilateral   Repeat      Eccentrics with leg extension machine      x10 bilateral 50#     Modified chair squat SL    8# ball 2x10 bilateral to 2 foam      Hip extension resisted supine with cable    25# 2x10 bilateral  45# repeat      Resisted hip flexion with cable weight    25# 2x10 R leg 15# all 4 directions x 10 with L leg     HS curls with SB    2x15  2x15  With eccentrics x10     Resisted donkey kick     Blue band 3x10  Repeat     SL bridge     2x10  Repeat     Leg press      SL 80# 2x8   # x12  160# x10  180# x5    Walking lunge with twist    2L 8# ball  Repeat  Repeat     SL clamshells with resistance band   2x20 bilateral   Repeat      Butterfly bridge   2x10   Repeat  2x15     Manual Therapy: 15 mins  25 mins  10 mins  10 mins  15 mins  30 mins    Dry needling  R peroneal muscle x static positioning x 2 needles   R glute med/piriformis x 3 needles static with pistoning before removing  25 mins R glute med and ITB region 10 mins R glute med and into the sartorius  10 mins L lateral quad near the knee followed by STM  15 mins R glute med  30 mins with STM and manual stretching following needling with static positioning             Therapeutic Activities:                                      HEP:  Pt was told to do the above stretches at home before and after running as well as ice massage for the peroneals. [employer] Portal  Treatment/Session Summary:    · Response to Treatment: pt was told to not run in the shoes when she gets them and walk for 2 days in them and then we will add in running on Wednesday. Continue with POC. · Communication/Consultation:  None today  · Equipment provided today:  None today  · Recommendations/Intent for next treatment session: Next visit will focus on stretching, hip strengthening and dry needling .     Total Treatment Billable Duration:  45 mins   PT Patient Time In/Time Out  Time In: 1100  Time Out: [facility], PT, DPT    Future Appointments   Date Time Provider Akilah Huff   10/14/2020  8:00 AM José Miguel Salgado PT, DPT [facility]   10/22/2020  8:00 AM José Miguel Salgado PT, DPT [facility]   10/27/2020  8:00 AM José Miguel Salgado PT, DPT [facility]   10/29/2020  8:00 AM José Miguel Salgado PT, DPT [facility]

## 2020-10-14 ENCOUNTER — HOSPITAL ENCOUNTER (OUTPATIENT)
Dept: PHYSICAL THERAPY | Age: 20
Discharge: HOME | End: 2020-10-14
Payer: COMMERCIAL

## 2020-10-14 PROCEDURE — 97110 THERAPEUTIC EXERCISES: CPT

## 2020-10-14 NOTE — PROGRESS NOTES
Corinne Markham  : 2000  Primary: Vivian Manrique Rpn  Secondary:  Ron Gonzalez @ [address]  [phone]   [phone]      OUTPATIENT PHYSICAL THERAPY: Daily Treatment Note and Progress Note  10/14/2020   Pain in right leg (M79.604), Pain in right hip (M25.551) and Pain in right knee (M25.561)  Therapy Diagnosis: R gluteal pain/ITB syndrome  Effective Dates: 2020 TO 2020 (90 days). Frequency/Duration: 2 times a week for 90 Day(s)  GOALS: (Goals have been discussed and agreed upon with patient.)  Short-Term Functional Goals: Time Frame: 4 weeks   1. Ms. Laura Amin to be independent with HEP. (MET)  2. Pt able to perform all exercises without complaint of pain in the glute or tightness. (Progressing)   3. Pt able to return to all functional activities without pain in the R leg and not waking up with LBP. (MET)  Discharge Goals: Time Frame: 12 weeks   1. Pt able to return to running 4 miles at least 2 times a week painfree in the R leg. (Progressing)   2. Pt to return to prior level of function without limitations painfree in the R leg. (Progressing)   3. Pt to have at least 4+/5 overall strength in the lower extremity. (Progressing)     _________________________________________________________________________  Pre-treatment Symptoms/Complaints: \"The shoes feel great and the pain isnt in the ankle or the knee or the hip when I did my walk run yesterday. \"   Pain: Initial: 0/10  Post Session:  0/10   Medications Last Reviewed:  10/14/2020    Next MD appt: ania     Updated Objective Findings:  None Today   Pt is showing progress with the above goals but just purchased new shoes and will see how this changes her pain level in each joint. Will be better to assess in 1 week due to new shoes      TREATMENT:   THERAPEUTIC ACTIVITY: ( see chart below for minutes):   Therapeutic activities per grid below to improve mobility and strength. Required minimal visual and verbal cues to promote dynamic balance in standing. THERAPEUTIC EXERCISE: (see chart below for minutes):  Exercises per grid below to improve mobility, strength, balance and coordination. Required minimal visual and verbal cues to promote proper body alignment, promote proper body posture and promote proper body mechanics. Progressed resistance, range, repetitions and complexity of movement as indicated. MANUAL THERAPY: (see chart below for minutes): Joint mobilization and Soft tissue mobilization was utilized and necessary because of the patient's restricted joint motion, painful spasm and restricted motion of soft tissue. MODALITIES: (see chart below for minutes):      see chart below for details on pain management. SELF CARE: (see chart below for minutes): Procedure(s) (per grid) utilized to improve and/or restore self-care/home management as related to functional activities . Required minimal visual, verbal and   cueing to facilitate activities of daily living skills.      Date: 9-24-20  (EVAL)  9-29-20  (Visit 2)  10-2-20  (Visit 3)  10-6-20  (Visit 4)  10-9-20  (Visit 5)  10-12-20  (Visit 6)  10-14-20  (Visit 7)  PN   Modalities:                                        Therapeutic Exercise: 10 mins  20 mins  35 mins  35 mins  30 mins  15 mins  45 mins    glute med stretch with figure 4 standing leg on table  2x10SH  Repeat   Repeat  Repeat  Repeat  Repeat    Hip flexor stretch kneeling  2x10SH  Repeat    Repeat  Repeat  Repeat    Peroneal stretch with foot IR  2x10SH  Repeat         Resisted side stepping   3L with 1 knees straight and 2 knees bent Repeat with green band  Repeat  Repeat with blue band      Bike      5 mins  5 mins  5 mins    Walking warm up with hip circles and hamstring stretch  2l  Repeat  Repeat  Repeat  Repeat     Hamstring stretch with strap and into ITB stretch   2x15SH each   Repeat  Repeat  Repeat  Repeat    Side lunge on sliders 2x10       Reverse lunge on sliders   2x10 bilateral   Repeat       Eccentrics with leg extension machine      x10 bilateral 50#   2x10 50#    Modified chair squat SL    8# ball 2x10 bilateral to 2 foam       Step up resisted        10in 15# x20 bilateral with high knee    Eccentric heel taps        2x10 bilateral 6in    Hip extension resisted supine with cable    25# 2x10 bilateral  45# repeat       Resisted hip flexion with cable weight    25# 2x10 R leg   15# all 4 directions x 10 with L leg      HS curls with SB    2x15  2x15  With eccentrics x10      Resisted donkey kick     Blue band 3x10  Repeat      SL bridge     2x10  Repeat   x15 bilateral    Leg press      SL 80# 2x8   # x12  160# x10  180# x5  140# x 15  160# x 10  180# x 8   Running/jogging        1min on/off x 3 reps in gym    Lateral leap        x20    Long leaps        2L    Walking lunge with twist    2L 8# ball  Repeat  Repeat      SL clamshells with resistance band   2x20 bilateral   Repeat       Butterfly bridge   2x10   Repeat  2x15      Manual Therapy: 15 mins  25 mins  10 mins  10 mins  15 mins  30 mins     Dry needling  R peroneal muscle x static positioning x 2 needles   R glute med/piriformis x 3 needles static with pistoning before removing  25 mins R glute med and ITB region 10 mins R glute med and into the sartorius  10 mins L lateral quad near the knee followed by STM  15 mins R glute med  30 mins with STM and manual stretching following needling with static positioning               Therapeutic Activities:                                          HEP:  Pt was told to do the above stretches at home before and after running as well as ice massage for the peroneals. [employer] Portal  Treatment/Session Summary:    · Response to Treatment: Pt to start with 2 min on and then increase each day up 1 min on until she is comfortable with continued running and is not having any pain. Pt will return next Thursday for follow up. · Communication/Consultation:  None today  · Equipment provided today:  None today  · Recommendations/Intent for next treatment session: Next visit will focus on stretching, hip strengthening and dry needling .     Total Treatment Billable Duration:  45 mins   PT Patient Time In/Time Out  Time In: 0800  Time Out: 0900    Nimisha Osborn PT, DPT    Future Appointments   Date Time Provider Akilah Huff   10/22/2020  8:00 AM Alejandrina Juárez PT, DPT [facility]   10/27/2020  8:00 AM Alejandrina Juárez PT, DPT [facility]   10/29/2020  8:00 AM Alejandrina Juárez PT, DPT [facility]

## 2020-10-22 ENCOUNTER — HOSPITAL ENCOUNTER (OUTPATIENT)
Dept: PHYSICAL THERAPY | Age: 20
Discharge: HOME | End: 2020-10-22
Payer: COMMERCIAL

## 2020-10-22 PROCEDURE — 97014 ELECTRIC STIMULATION THERAPY: CPT

## 2020-10-22 PROCEDURE — 97110 THERAPEUTIC EXERCISES: CPT

## 2020-10-22 NOTE — PROGRESS NOTES
Tj Lira  : 2000  Primary: Nael Manrique Rpn  Secondary:  [address] @ [place] [address].  [phone]   [phone]      OUTPATIENT PHYSICAL THERAPY: Daily Treatment Note  10/22/2020   Pain in right leg (M79.604), Pain in right hip (M25.551) and Pain in right knee (M25.561)  Therapy Diagnosis: R gluteal pain/ITB syndrome  Effective Dates: 2020 TO 2020 (90 days). Frequency/Duration: 2 times a week for 90 Day(s)  GOALS: (Goals have been discussed and agreed upon with patient.)  Short-Term Functional Goals: Time Frame: 4 weeks   1. Ms. Subramanian Anchors to be independent with HEP. (MET)  2. Pt able to perform all exercises without complaint of pain in the glute or tightness. (Progressing)   3. Pt able to return to all functional activities without pain in the R leg and not waking up with LBP. (MET)  Discharge Goals: Time Frame: 12 weeks   1. Pt able to return to running 4 miles at least 2 times a week painfree in the R leg. (Progressing)   2. Pt to return to prior level of function without limitations painfree in the R leg. (Progressing)   3. Pt to have at least 4+/5 overall strength in the lower extremity. (Progressing)     _________________________________________________________________________  Pre-treatment Symptoms/Complaints: \"Im having a tough time getting the hips to even out. The R feels weak and the L feels tight. \"   Pain: Initial: 0/10  Post Session:  0/10   Medications Last Reviewed:  10/22/2020    Next MD appt: ania     Updated Objective Findings:  None Today        TREATMENT:   THERAPEUTIC ACTIVITY: ( see chart below for minutes): Therapeutic activities per grid below to improve mobility and strength. Required minimal visual and verbal cues to promote dynamic balance in standing.   THERAPEUTIC EXERCISE: (see chart below for minutes):  Exercises per grid below to improve mobility, strength, balance and coordination. Required minimal visual and verbal cues to promote proper body alignment, promote proper body posture and promote proper body mechanics. Progressed resistance, range, repetitions and complexity of movement as indicated. MANUAL THERAPY: (see chart below for minutes): Joint mobilization and Soft tissue mobilization was utilized and necessary because of the patient's restricted joint motion, painful spasm and restricted motion of soft tissue. MODALITIES: (see chart below for minutes):      see chart below for details on pain management. SELF CARE: (see chart below for minutes): Procedure(s) (per grid) utilized to improve and/or restore self-care/home management as related to functional activities . Required minimal visual, verbal and   cueing to facilitate activities of daily living skills.      Date: 9-24-20  (EVAL)  9-29-20  (Visit 2)  10-2-20  (Visit 3)  10-6-20  (Visit 4)  10-9-20  (Visit 5)  10-12-20  (Visit 6)  10-14-20  (Visit 7)  PN 10-22-20  (Visit 8)    Modalities:        15 mins    Pre-mod         L peroneal and L glute med x 15 mins                          Therapeutic Exercise: 10 mins  20 mins  35 mins  35 mins  30 mins  15 mins  45 mins  45 mins    glute med stretch with figure 4 standing leg on table  2x10SH  Repeat   Repeat  Repeat  Repeat  Repeat  Repeat    Hip flexor stretch kneeling  2x10SH  Repeat    Repeat  Repeat  Repeat     Peroneal stretch with foot IR  2x10SH  Repeat          Resisted side stepping   3L with 1 knees straight and 2 knees bent Repeat with green band  Repeat  Repeat with blue band       Bike      5 mins  5 mins  5 mins  Repeat    Walking warm up with hip circles and hamstring stretch  2l  Repeat  Repeat  Repeat  Repeat      Hamstring stretch with strap and into ITB stretch   2x15SH each   Repeat  Repeat  Repeat  Repeat  Repeat    Side lunge on sliders     2x10        Reverse lunge on sliders   2x10 bilateral   Repeat        Eccentrics with leg extension machine      x10 bilateral 50#   2x10 50#     Modified chair squat SL    8# ball 2x10 bilateral to 2 foam     Repeat    Step up resisted        10in 15# x20 bilateral with high knee     Eccentric heel taps        2x10 bilateral 6in     Hip extension resisted supine with cable    25# 2x10 bilateral  45# repeat        Resisted hip flexion with cable weight    25# 2x10 R leg   15# all 4 directions x 10 with L leg       HS curls with SB    2x15  2x15  With eccentrics x10       Resisted donkey kick     Blue band 3x10  Repeat    Black band 2x10    SL bridge     2x10  Repeat   x15 bilateral  Repeat    Leg press      SL 80# 2x8   # x12  160# x10  180# x5  140# x 15  160# x 10  180# x 8 100# SL 2x5 reps bilateral    Running/jogging        1min on/off x 3 reps in gym     Lateral leap        x20     Long leaps        2L     RDLs        Cross over x10  Forward with high knee x 10 red kettlebell    Walking lunge with twist    2L 8# ball  Repeat  Repeat       SL clamshells with resistance band   2x20 bilateral   Repeat     Repeat    Eccentric leg with bridge on sliders         2x10    Fire hydrants         2x10    SL sit to stands         2x10 R leg    Butterfly bridge   2x10   Repeat  2x15    Repeat with 8#    Manual Therapy: 15 mins  25 mins  10 mins  10 mins  15 mins  30 mins      Dry needling  R peroneal muscle x static positioning x 2 needles   R glute med/piriformis x 3 needles static with pistoning before removing  25 mins R glute med and ITB region 10 mins R glute med and into the sartorius  10 mins L lateral quad near the knee followed by STM  15 mins R glute med  30 mins with STM and manual stretching following needling with static positioning                 Therapeutic Activities:                                              HEP:  Pt was told to do the above stretches at home before and after running as well as ice massage for the peroneals.      [employer] [facility]  Treatment/Session Summary:    · Response to Treatment: Pt is still having trouble with feeling tight on the L side and weak on the R hip. Pt was told to work on the hip strengthening exercises and will continue to focus on single leg exercises for strengthening. · Communication/Consultation:  None today  · Equipment provided today:  None today  · Recommendations/Intent for next treatment session: Next visit will focus on stretching, hip strengthening and dry needling .     Total Treatment Billable Duration:  60 mins   PT Patient Time In/Time Out  Time In: 0800  Time Out: 0900    Jillian Foss PT, DPT    Future Appointments   Date Time Provider Akilah Huff   10/27/2020  8:00 AM Crystal Patel, PT, DPT [facility]   10/29/2020  8:00 AM Crystal Patel, PT, DPT [facility]   11/3/2020  8:00 AM Crystal Patel, PT, DPT [facility]   11/5/2020  8:00 AM Crystal Patel, PT, DPT [facility]   11/10/2020  8:00 AM Crystal Patel, PT, DPT [facility]   11/12/2020  8:00 AM Crystal Patel, PT, DPT [facility]   11/17/2020  8:00 AM Crystal Patel, PT, DPT [facility]   11/19/2020  8:00 AM Crystal Patel, PT, DPT [facility]   11/24/2020  8:00 AM Crysatl Patel, PT, DPT [facility]

## 2020-10-27 ENCOUNTER — HOSPITAL ENCOUNTER (OUTPATIENT)
Dept: PHYSICAL THERAPY | Age: 20
Discharge: HOME | End: 2020-10-27
Payer: COMMERCIAL

## 2020-10-27 PROCEDURE — 97140 MANUAL THERAPY 1/> REGIONS: CPT

## 2020-10-27 PROCEDURE — 97110 THERAPEUTIC EXERCISES: CPT

## 2020-10-27 NOTE — PROGRESS NOTES
Corinne Markham  : 2000  Primary: Vivian Manrique Rpn  Secondary:  Ron Gonzalez @ [address] [place] [address].  [phone]   [phone]      OUTPATIENT PHYSICAL THERAPY: Daily Treatment Note  10/27/2020   Pain in right leg (M79.604), Pain in right hip (M25.551) and Pain in right knee (M25.561)  Therapy Diagnosis: R gluteal pain/ITB syndrome  Effective Dates: 2020 TO 2020 (90 days). Frequency/Duration: 2 times a week for 90 Day(s)  GOALS: (Goals have been discussed and agreed upon with patient.)  Short-Term Functional Goals: Time Frame: 4 weeks   1. Ms. Laura Amin to be independent with HEP. (MET)  2. Pt able to perform all exercises without complaint of pain in the glute or tightness. (Progressing)   3. Pt able to return to all functional activities without pain in the R leg and not waking up with LBP. (MET)  Discharge Goals: Time Frame: 12 weeks   1. Pt able to return to running 4 miles at least 2 times a week painfree in the R leg. (Progressing)   2. Pt to return to prior level of function without limitations painfree in the R leg. (Progressing)   3. Pt to have at least 4+/5 overall strength in the lower extremity. (Progressing)     _________________________________________________________________________  Pre-treatment Symptoms/Complaints: \"Im feeling pretty good but im having some pain in the front of the R hip and I feel like the glute isnt firing like it should. \"   Pain: Initial: 0/10  Post Session:  0/10   Medications Last Reviewed:  10/27/2020    Next MD appt: ania     Updated Objective Findings:  None Today        TREATMENT:   THERAPEUTIC ACTIVITY: ( see chart below for minutes): Therapeutic activities per grid below to improve mobility and strength. Required minimal visual and verbal cues to promote dynamic balance in standing.   THERAPEUTIC EXERCISE: (see chart below for minutes):  Exercises per grid below to improve mobility, strength, balance and coordination. Required minimal visual and verbal cues to promote proper body alignment, promote proper body posture and promote proper body mechanics. Progressed resistance, range, repetitions and complexity of movement as indicated. MANUAL THERAPY: (see chart below for minutes): Joint mobilization and Soft tissue mobilization was utilized and necessary because of the patient's restricted joint motion, painful spasm and restricted motion of soft tissue. MODALITIES: (see chart below for minutes):      see chart below for details on pain management. SELF CARE: (see chart below for minutes): Procedure(s) (per grid) utilized to improve and/or restore self-care/home management as related to functional activities . Required minimal visual, verbal and   cueing to facilitate activities of daily living skills.      Date: 10-6-20  (Visit 4)  10-9-20  (Visit 5)  10-12-20  (Visit 6)  10-14-20  (Visit 7)  PN 10-22-20  (Visit 8)  10-27-20  (Visit 9)    Modalities:     15 mins     Pre-mod      L peroneal and L glute med x 15 mins                       Therapeutic Exercise: 35 mins  30 mins  15 mins  45 mins  45 mins  30 mins    glute med stretch with figure 4 standing leg on table  Repeat  Repeat  Repeat  Repeat  Repeat  4x10SH    Hip flexor stretch kneeling   Repeat  Repeat  Repeat   4x10SH    Peroneal stretch with foot IR          Resisted side stepping  Repeat  Repeat with blue band     Repeat with blue band 2L    Bike   5 mins  5 mins  5 mins  Repeat  5 mins    Walking warm up with hip circles and hamstring stretch Repeat  Repeat  Repeat    2L each    Hamstring stretch with strap and into ITB stretch  Repeat  Repeat  Repeat  Repeat  Repeat  Repeat    Side lunge on sliders  2x10      Walking lunge with overhead reach    Reverse lunge on sliders  Repeat         Eccentrics with leg extension machine   x10 bilateral 50#   2x10 50#      Modified chair squat SL      Repeat     Step up resisted 10in 15# x20 bilateral with high knee      Eccentric heel taps     2x10 bilateral 6in      Hip extension resisted supine with cable  45# repeat         Resisted hip flexion with cable weight   15# all 4 directions x 10 with L leg     25# 2x10 bilateral    HS curls with SB  2x15  With eccentrics x10     2x15    Resisted donkey kick  Blue band 3x10  Repeat    Black band 2x10     SL bridge  2x10  Repeat   x15 bilateral  Repeat     Leg press   SL 80# 2x8   # x12  160# x10  180# x5  140# x 15  160# x 10  180# x 8 100# SL 2x5 reps bilateral  Repeat 2x8 bilateral    Running/jogging     1min on/off x 3 reps in gym      Lateral leap     x20      Long leaps     2L      RDLs     Cross over x10  Forward with high knee x 10 red kettlebell  Repeat    Walking lunge with twist  Repeat  Repeat        SL clamshells with resistance band  Repeat     Repeat     Eccentric leg with bridge on sliders      2x10     Fire hydrants      2x10     SL sit to stands      2x10 R leg     Butterfly bridge  Repeat  2x15    Repeat with 8#     Manual Therapy: 10 mins  15 mins  30 mins    15 mins    Dry needling  10 mins L lateral quad near the knee followed by STM  15 mins R glute med  30 mins with STM and manual stretching following needling with static positioning    15 mins STM with focus on R hip flexor and needling following STM for same muscle             Therapeutic Activities:                                      HEP:  Pt was told to do the above stretches at home before and after running as well as ice massage for the peroneals. [email or web address] Portal  Treatment/Session Summary:    · Response to Treatment: R hip weakness and glute weakness with hip flexor tightness as well as continue with core strengthening. · Communication/Consultation:  None today  · Equipment provided today:  None today  · Recommendations/Intent for next treatment session: Next visit will focus on stretching, hip strengthening and dry needling .     Total Treatment Billable Duration:  45 mins   PT Patient Time In/Time Out  Time In: 0800  Time Out: 0900    Marielle Benedict, PT, DPT    Future Appointments   Date Time Provider Akilah Huff   10/29/2020  8:00 AM Mariella Settler, PT, DPT [facility]   11/3/2020  8:00 AM Mariella Settler, PT, DPT [facility]   11/5/2020  8:00 AM Mariella Settler, PT, DPT [facility]   11/10/2020  8:00 AM Mariella Settler, PT, DPT [facility]   11/12/2020  8:00 AM Mariella Settler, PT, DPT [facility]   11/17/2020  8:00 AM Mariella Settler, PT, DPT [facility]   11/19/2020  8:00 AM Mariella Settler, PT, DPT [facility]   11/24/2020  8:00 AM Mariella Settler, PT, DPT [facility]

## 2020-10-29 ENCOUNTER — HOSPITAL ENCOUNTER (OUTPATIENT)
Dept: PHYSICAL THERAPY | Age: 20
Discharge: HOME | End: 2020-10-29
Payer: COMMERCIAL

## 2020-11-03 ENCOUNTER — HOSPITAL ENCOUNTER (OUTPATIENT)
Dept: PHYSICAL THERAPY | Age: 20
Discharge: HOME | End: 2020-11-03
Payer: COMMERCIAL

## 2020-11-03 PROCEDURE — 97014 ELECTRIC STIMULATION THERAPY: CPT

## 2020-11-03 PROCEDURE — 97140 MANUAL THERAPY 1/> REGIONS: CPT

## 2020-11-03 PROCEDURE — 97110 THERAPEUTIC EXERCISES: CPT

## 2020-11-03 NOTE — PROGRESS NOTES
Kayla Ramos  : 2000  Primary: Fay Manrique Rpn  Secondary:  Jatinder Gallego @ [employer]  [address] [place] [address]  [phone]   [phone]      OUTPATIENT PHYSICAL THERAPY: Daily Treatment Note  11/3/2020   Pain in right leg (M79.604), Pain in right hip (M25.551) and Pain in right knee (M25.561)  Therapy Diagnosis: R gluteal pain/ITB syndrome  Effective Dates: 2020 TO 2020 (90 days). Frequency/Duration: 2 times a week for 90 Day(s)  GOALS: (Goals have been discussed and agreed upon with patient.)  Short-Term Functional Goals: Time Frame: 4 weeks   1. Ms. Martines Ro to be independent with HEP. (MET)  2. Pt able to perform all exercises without complaint of pain in the glute or tightness. (Progressing)   3. Pt able to return to all functional activities without pain in the R leg and not waking up with LBP. (MET)  Discharge Goals: Time Frame: 12 weeks   1. Pt able to return to running 4 miles at least 2 times a week painfree in the R leg. (Progressing)   2. Pt to return to prior level of function without limitations painfree in the R leg. (Progressing)   3. Pt to have at least 4+/5 overall strength in the lower extremity. (Progressing)     _________________________________________________________________________  Pre-treatment Symptoms/Complaints: \"The R hip was giving me a lot of trouble when I tried to run this week and the L knee is bothering me a little bit on the outside. \"   Pain: Initial: 0/10  Post Session:  0/10   Medications Last Reviewed:  11/3/2020    Next MD appt: tbd     Updated Objective Findings:  None Today        TREATMENT:   THERAPEUTIC ACTIVITY: ( see chart below for minutes): Therapeutic activities per grid below to improve mobility and strength. Required minimal visual and verbal cues to promote dynamic balance in standing.   THERAPEUTIC EXERCISE: (see chart below for minutes):  Exercises per grid below to improve mobility, strength, balance and coordination. Required minimal visual and verbal cues to promote proper body alignment, promote proper body posture and promote proper body mechanics. Progressed resistance, range, repetitions and complexity of movement as indicated. MANUAL THERAPY: (see chart below for minutes): Joint mobilization and Soft tissue mobilization was utilized and necessary because of the patient's restricted joint motion, painful spasm and restricted motion of soft tissue. MODALITIES: (see chart below for minutes):      see chart below for details on pain management. SELF CARE: (see chart below for minutes): Procedure(s) (per grid) utilized to improve and/or restore self-care/home management as related to functional activities . Required minimal visual, verbal and   cueing to facilitate activities of daily living skills.      Date: 10-6-20  (Visit 4)  10-9-20  (Visit 5)  10-12-20  (Visit 6)  10-14-20  (Visit 7)  PN 10-22-20  (Visit 8)  10-27-20  (Visit 9)  11-3-20  (Visit 10)    Modalities:     15 mins   15 mins    Pre-mod      L peroneal and L glute med x 15 mins   15 mins                        Therapeutic Exercise: 35 mins  30 mins  15 mins  45 mins  45 mins  30 mins  30 mins    glute med stretch with figure 4 standing leg on table  Repeat  Repeat  Repeat  Repeat  Repeat  4x10SH     Hip flexor stretch kneeling   Repeat  Repeat  Repeat   4x10SH     Peroneal stretch with foot IR           Resisted side stepping  Repeat  Repeat with blue band     Repeat with blue band 2L     Bike   5 mins  5 mins  5 mins  Repeat  5 mins     Walking warm up with hip circles and hamstring stretch Repeat  Repeat  Repeat    2L each     Hamstring stretch with strap and into ITB stretch  Repeat  Repeat  Repeat  Repeat  Repeat  Repeat     Side lunge on sliders  2x10      Walking lunge with overhead reach  Reverse lunge with slider and red kettlebell x10  x10 curtsey lunge bilateral    Reverse lunge on sliders  Repeat Eccentrics with leg extension machine   x10 bilateral 50#   2x10 50#       Modified chair squat SL      Repeat   2x10 bilateral    Step up resisted     10in 15# x20 bilateral with high knee       Eccentric heel taps     2x10 bilateral 6in       Hip extension resisted supine with cable  45# repeat       45# 2x10 bilateral    Resisted hip flexion with cable weight   15# all 4 directions x 10 with L leg     25# 2x10 bilateral     HS curls with SB  2x15  With eccentrics x10     2x15  With roller 2x15    Resisted donkey kick  Blue band 3x10  Repeat    Black band 2x10   2x15 black band    SL bridge  2x10  Repeat   x15 bilateral  Repeat      Leg press   SL 80# 2x8   # x12  160# x10  180# x5  140# x 15  160# x 10  180# x 8 100# SL 2x5 reps bilateral  Repeat 2x8 bilateral     Running/jogging     1min on/off x 3 reps in gym       Lateral leap     x20       Long leaps     2L       RDLs     Cross over x10  Forward with high knee x 10 red kettlebell  Repeat  Repeat    Walking lunge with twist  Repeat  Repeat         SL clamshells with resistance band  Repeat     Repeat      Eccentric leg with bridge on sliders      2x10      Fire hydrants      2x10      SL sit to stands      2x10 R leg   Repeat    Butterfly bridge  Repeat  2x15    Repeat with 8#   Repeat with 15# bar    Manual Therapy: 10 mins  15 mins  30 mins    15 mins  15 mins    Dry needling  10 mins L lateral quad near the knee followed by STM  15 mins R glute med  30 mins with STM and manual stretching following needling with static positioning    15 mins STM with focus on R hip flexor and needling following STM for same muscle  Needling on the L knee lateral quad              Therapeutic Activities:                                          HEP:  Pt was told to do the above stretches at home before and after running as well as ice massage for the peroneals.      [employer] Portal  Treatment/Session Summary:    · Response to Treatment: R hip weakness and glute weakness with hip flexor tightness as well as continue with core strengthening. · Communication/Consultation:  None today  · Equipment provided today:  None today  · Recommendations/Intent for next treatment session: Next visit will focus on stretching, hip strengthening and dry needling .     Total Treatment Billable Duration:  60 mins   PT Patient Time In/Time Out  Time In: 0800  Time Out: 0900    Eden Deluna, PT, DPT    Future Appointments   Date Time Provider Akilah Huff   11/10/2020  8:00 AM Armani Su, PT, DPT [facility]   11/17/2020  8:00 AM Armani Su, PT, DPT [facility]

## 2020-11-05 ENCOUNTER — APPOINTMENT (OUTPATIENT)
Dept: PHYSICAL THERAPY | Age: 20
End: 2020-11-05
Payer: COMMERCIAL

## 2020-11-10 ENCOUNTER — HOSPITAL ENCOUNTER (OUTPATIENT)
Dept: PHYSICAL THERAPY | Age: 20
Discharge: HOME | End: 2020-11-10
Payer: COMMERCIAL

## 2020-11-10 PROCEDURE — 97014 ELECTRIC STIMULATION THERAPY: CPT

## 2020-11-10 PROCEDURE — 97110 THERAPEUTIC EXERCISES: CPT

## 2020-11-10 NOTE — PROGRESS NOTES
Tj Lira  : 2000  Primary: Nael Manrique Rpn  Secondary:  [address] [facility] @ [address], [place] [address].  [phone]   [phone]      OUTPATIENT PHYSICAL THERAPY: Daily Treatment Note  11/10/2020   Pain in right leg (M79.604), Pain in right hip (M25.551) and Pain in right knee (M25.561)  Therapy Diagnosis: R gluteal pain/ITB syndrome  Effective Dates: 2020 TO 2020 (90 days). Frequency/Duration: 2 times a week for 90 Day(s)  GOALS: (Goals have been discussed and agreed upon with patient.)  Short-Term Functional Goals: Time Frame: 4 weeks   1. Ms. Subramanian Anchors to be independent with HEP. (MET)  2. Pt able to perform all exercises without complaint of pain in the glute or tightness. (Progressing)   3. Pt able to return to all functional activities without pain in the R leg and not waking up with LBP. (MET)  Discharge Goals: Time Frame: 12 weeks   1. Pt able to return to running 4 miles at least 2 times a week painfree in the R leg. (Progressing)   2. Pt to return to prior level of function without limitations painfree in the R leg. (Progressing)   3. Pt to have at least 4+/5 overall strength in the lower extremity. (Progressing)     _________________________________________________________________________  Pre-treatment Symptoms/Complaints: \"Im feeling much better this week. Areli Venegas been doing a lot of the spin bike. \"   Pain: Initial: 0/10  Post Session:  0/10   Medications Last Reviewed:  11/10/2020    Next MD appt: ania     Updated Objective Findings:  None Today        TREATMENT:   THERAPEUTIC ACTIVITY: ( see chart below for minutes): Therapeutic activities per grid below to improve mobility and strength. Required minimal visual and verbal cues to promote dynamic balance in standing. THERAPEUTIC EXERCISE: (see chart below for minutes):  Exercises per grid below to improve mobility, strength, balance and coordination.   Required minimal visual and verbal cues to promote proper body alignment, promote proper body posture and promote proper body mechanics. Progressed resistance, range, repetitions and complexity of movement as indicated. MANUAL THERAPY: (see chart below for minutes): Joint mobilization and Soft tissue mobilization was utilized and necessary because of the patient's restricted joint motion, painful spasm and restricted motion of soft tissue. MODALITIES: (see chart below for minutes):      see chart below for details on pain management. SELF CARE: (see chart below for minutes): Procedure(s) (per grid) utilized to improve and/or restore self-care/home management as related to functional activities . Required minimal visual, verbal and   cueing to facilitate activities of daily living skills.      Date: 10-6-20  (Visit 4)  10-9-20  (Visit 5)  10-12-20  (Visit 6)  10-14-20  (Visit 7)  PN 10-22-20  (Visit 8)  10-27-20  (Visit 9)  11-3-20  (Visit 10)  11-10-20  (Visit 11)    Modalities:     15 mins   15 mins  15 mins    Pre-mod      L peroneal and L glute med x 15 mins   15 mins  15 mins                          Therapeutic Exercise: 35 mins  30 mins  15 mins  45 mins  45 mins  30 mins  30 mins  30 mins    glute med stretch with figure 4 standing leg on table  Repeat  Repeat  Repeat  Repeat  Repeat  4x10SH   Repeat    Hip flexor stretch kneeling   Repeat  Repeat  Repeat   4x10SH   Repeat    Peroneal stretch with foot IR            Resisted side stepping  Repeat  Repeat with blue band     Repeat with blue band 2L   2L with green band    Bike   5 mins  5 mins  5 mins  Repeat  5 mins  5 mins  5 mins    Monster walk         2L with green band    Walking warm up with hip circles and hamstring stretch Repeat  Repeat  Repeat    2L each   2L    Hamstring stretch with strap and into ITB stretch  Repeat  Repeat  Repeat  Repeat  Repeat  Repeat   Repeat    Side lunge on sliders  2x10      Walking lunge with overhead reach  Reverse lunge with slider and red kettlebell x10  x10 curtsey lunge bilateral  Full plank with slider into abduction x20 bilateral    Reverse lunge on sliders  Repeat        x20 bilateral    Eccentrics with leg extension machine   x10 bilateral 50#   2x10 50#        Modified chair squat SL      Repeat   2x10 bilateral     Step up resisted     10in 15# x20 bilateral with high knee        Eccentric heel taps     2x10 bilateral 6in     10 in step 2x10 bilateral    Hip extension resisted supine with cable  45# repeat       45# 2x10 bilateral  55# x20 bilateral    Resisted hip flexion with cable weight   15# all 4 directions x 10 with L leg     25# 2x10 bilateral      HS curls with SB  2x15  With eccentrics x10     2x15  With roller 2x15  Repeat    Resisted donkey kick  Blue band 3x10  Repeat    Black band 2x10   2x15 black band  Repeat    SL bridge  2x10  Repeat   x15 bilateral  Repeat       Leg press   SL 80# 2x8   # x12  160# x10  180# x5  140# x 15  160# x 10  180# x 8 100# SL 2x5 reps bilateral  Repeat 2x8 bilateral      Running/jogging     1min on/off x 3 reps in gym        Lateral leap     x20        Long leaps     2L        RDLs     Cross over x10  Forward with high knee x 10 red kettlebell  Repeat  Repeat  2x10 bilateral with red kettlebell    Walking lunge with twist  Repeat  Repeat          SL clamshells with resistance band  Repeat     Repeat       Eccentric leg with bridge on sliders      2x10       Squats with resistance and bar         15# bar with blue band 3x10   Fire hydrants      2x10       SL sit to stands      2x10 R leg   Repeat     Butterfly bridge  Repeat  2x15    Repeat with 8#   Repeat with 15# bar  Repeat    Manual Therapy: 10 mins  15 mins  30 mins    15 mins  15 mins     Dry needling  10 mins L lateral quad near the knee followed by STM  15 mins R glute med  30 mins with STM and manual stretching following needling with static positioning    15 mins STM with focus on R hip flexor and needling following STM for same muscle  Needling on the L knee lateral quad                Therapeutic Activities:                                              HEP:  Pt was told to do the above stretches at home before and after running as well as ice massage for the peroneals. [employer] [facility]  Treatment/Session Summary:    · Response to Treatment: next week we will print out HEP and pt will be discharged due to going home for the holidays. · Communication/Consultation:  None today  · Equipment provided today:  None today  · Recommendations/Intent for next treatment session: Next visit will focus on stretching, hip strengthening and dry needling .     Total Treatment Billable Duration:  60 mins   PT Patient Time In/Time Out  Time In: 0800  Time Out: 0900    Luis Bennett, PT, DPT    Future Appointments   Date Time Provider Akilah Huff   11/17/2020  8:00 AM Helen Vasquez, PT, DPT [facility]

## 2020-11-12 ENCOUNTER — APPOINTMENT (OUTPATIENT)
Dept: PHYSICAL THERAPY | Age: 20
End: 2020-11-12
Payer: COMMERCIAL

## 2020-11-17 ENCOUNTER — HOSPITAL ENCOUNTER (OUTPATIENT)
Dept: PHYSICAL THERAPY | Age: 20
Discharge: HOME | End: 2020-11-17
Payer: COMMERCIAL

## 2020-11-17 PROCEDURE — 97110 THERAPEUTIC EXERCISES: CPT

## 2020-11-17 NOTE — THERAPY DISCHARGE
Chapis Cardona : 2000 Primary: Bshsi Cigval[employer] Rpn Secondary:  [address] @ [place] [address]. [phone]   [phone] OUTPATIENT PHYSICAL THERAPY: Daily Treatment Note and Discharge 2020 Pain in right leg (M79.604), Pain in right hip (M25.551) and Pain in right knee (M25.561) Therapy Diagnosis: R gluteal pain/ITB syndrome Effective Dates: 2020 TO 2020 (90 days). Frequency/Duration: 2 times a week for 90 Day(s) GOALS: (Goals have been discussed and agreed upon with patient.) Short-Term Functional Goals: Time Frame: 4 weeks 1. Ms. Syd Staley to be independent with HEP. (MET) 2. Pt able to perform all exercises without complaint of pain in the glute or tightness. (Progressing) 3. Pt able to return to all functional activities without pain in the R leg and not waking up with LBP. (MET) Discharge Goals: Time Frame: 12 weeks 1. Pt able to return to running 4 miles at least 2 times a week painfree in the R leg. (Not met) 2. Pt to return to prior level of function without limitations painfree in the R leg. (Progressing) 3. Pt to have at least 4+/5 overall strength in the lower extremity. (Progressing)  
 
_________________________________________________________________________ Pre-treatment Symptoms/Complaints: \"Im  Feeling better on the R side but the left side feels like its taking a lot of the force and resistance. \"  
Pain: Initial: 0/10  Post Session:  0/10 Medications Last Reviewed:  2020 Next MD appt: ania  
 
Updated Objective Findings:  None Today TREATMENT:  
THERAPEUTIC ACTIVITY: ( see chart below for minutes): Therapeutic activities per grid below to improve mobility and strength. Required minimal visual and verbal cues to promote dynamic balance in standing.  
THERAPEUTIC EXERCISE: (see chart below for minutes):  Exercises per grid below to improve mobility, strength, balance and coordination. Required minimal visual and verbal cues to promote proper body alignment, promote proper body posture and promote proper body mechanics. Progressed resistance, range, repetitions and complexity of movement as indicated. MANUAL THERAPY: (see chart below for minutes): Joint mobilization and Soft tissue mobilization was utilized and necessary because of the patient's restricted joint motion, painful spasm and restricted motion of soft tissue. MODALITIES: (see chart below for minutes):      see chart below for details on pain management. SELF CARE: (see chart below for minutes): Procedure(s) (per grid) utilized to improve and/or restore self-care/home management as related to functional activities . Required minimal visual, verbal and   cueing to facilitate activities of daily living skills. Date: 10-14-20 
(Visit 7) PN 10-22-20 
(Visit 8)  10-27-20 
(Visit 9)  11-3-20 
(Visit 10)  11-10-20 
(Visit 11)  11-17-20 
(Visit 12) Modalities:  15 mins   15 mins  15 mins Pre-mod   L peroneal and L glute med x 15 mins   15 mins  15 mins Therapeutic Exercise: 45 mins  45 mins  30 mins  30 mins  30 mins  45 mins Functional movement hip swings in salas pose       x10 bilateral  
glute med stretch with figure 4 standing leg on table  Repeat  Repeat  4x10SH   Repeat Hip flexor stretch kneeling  Repeat   4x10SH   Repeat Peroneal stretch with foot IR Resisted side stepping    Repeat with blue band 2L   2L with green band Bike  5 mins  Repeat  5 mins  5 mins  5 mins  Repeat Monster walk      2L with green band Walking warm up with hip circles and hamstring stretch   2L each   2L  Repeat Hamstring stretch with strap and into ITB stretch  Repeat  Repeat  Repeat   Repeat Side lunge on sliders    Walking lunge with overhead reach  Reverse lunge with slider and red kettlebell x10 
 x10 curtsey lunge bilateral  Full plank with slider into abduction x20 bilateral  Repeat Reverse lunge on sliders      x20 bilateral  Repeat Eccentrics with leg extension machine  2x10 50# Modified chair squat SL   Repeat   2x10 bilateral     
Step up resisted  10in 15# x20 bilateral with high knee Eccentric heel taps  2x10 bilateral 6in     10 in step 2x10 bilateral    
Hip extension resisted supine with cable     45# 2x10 bilateral  55# x20 bilateral    
Resisted hip flexion with cable weight    25# 2x10 bilateral      
HS curls with SB    2x15  With roller 2x15  Repeat Resisted donkey kick   Black band 2x10   2x15 black band  Repeat  x20- bilateral   
SL bridge  x15 bilateral  Repeat Leg press  140# x 15 
160# x 10 
180# x 8 100# SL 2x5 reps bilateral  Repeat 2x8 bilateral      
Running/jogging  1min on/off x 3 reps in gym Lateral leap  x20 Long leaps  2L RDLs  Cross over x10 Forward with high knee x 10 red kettlebell  Repeat  Repeat  2x10 bilateral with red kettlebell  Cross over with red kettlebell 2x10 Hip hikes       10 in step x 20 bilateral   
Walking lunge with twist         
SL clamshells with resistance band   Repeat Eccentric leg with bridge on sliders   2x10     Repeat Squats with resistance and bar      15# bar with blue band 3x10 Repeat Fire hydrants   2x10     Repeat SL sit to stands   2x10 R leg   Repeat Butterfly bridge   Repeat with 8#   Repeat with 15# bar  Repeat  Repeat Manual Therapy:   15 mins  15 mins Dry needling    15 mins STM with focus on R hip flexor and needling following STM for same muscle  Needling on the L knee lateral quad Therapeutic Activities: HEP:  Pt was told to do the above stretches at home before and after running as well as ice massage for the peroneals. [employer] [facility] 
Treatment/Session Summary: · Response to Treatment: Pt was discharged to [facility] due to leaving school for the holidays. She will not be returning for 2 months so she is discharged at this time. Total Treatment Billable Duration:  45 mins PT Patient Time In/Time Out Time In: 0800 Time Out: 0296 Miguel Moy, PT, DPT No future appointments.

## 2020-11-19 ENCOUNTER — APPOINTMENT (OUTPATIENT)
Dept: PHYSICAL THERAPY | Age: 20
End: 2020-11-19
Payer: COMMERCIAL

## 2020-11-24 ENCOUNTER — APPOINTMENT (OUTPATIENT)
Dept: PHYSICAL THERAPY | Age: 20
End: 2020-11-24
Payer: COMMERCIAL

## 2021-01-14 ENCOUNTER — ECZEMA (OUTPATIENT)
Dept: URBAN - METROPOLITAN AREA CLINIC 30 | Facility: CLINIC | Age: 21
Setting detail: DERMATOLOGY
End: 2021-01-14

## 2021-01-14 ENCOUNTER — RX ONLY (RX ONLY)
Age: 21
End: 2021-01-14

## 2021-01-14 DIAGNOSIS — C44.629 SQUAMOUS CELL CARCINOMA OF SKIN OF LEFT UPPER LIMB, INCLUDING SHOULDER: ICD-10-CM

## 2021-01-14 PROCEDURE — 99214 OFFICE O/P EST MOD 30 MIN: CPT

## 2021-01-14 RX ORDER — DESONIDE 0.5 MG/G
1 APPLICATION CREAM TOPICAL BID
Qty: 60 | Refills: 0
Start: 2021-01-14

## 2021-02-18 ENCOUNTER — RX ONLY (RX ONLY)
Age: 21
End: 2021-02-18

## 2021-02-18 ENCOUNTER — TELEHEALTH (OUTPATIENT)
Dept: URBAN - METROPOLITAN AREA CLINIC 36 | Facility: CLINIC | Age: 21
Setting detail: DERMATOLOGY
End: 2021-02-18

## 2021-02-18 DIAGNOSIS — Z41.9 ENCOUNTER FOR PROCEDURE FOR PURPOSES OTHER THAN REMEDYING HEALTH STATE, UNSPECIFIED: ICD-10-CM

## 2021-02-18 PROCEDURE — 99214 OFFICE O/P EST MOD 30 MIN: CPT
